# Patient Record
Sex: MALE | Race: WHITE | NOT HISPANIC OR LATINO | Employment: UNEMPLOYED | URBAN - METROPOLITAN AREA
[De-identification: names, ages, dates, MRNs, and addresses within clinical notes are randomized per-mention and may not be internally consistent; named-entity substitution may affect disease eponyms.]

---

## 2022-07-18 ENCOUNTER — LAB VISIT (OUTPATIENT)
Dept: LAB | Facility: HOSPITAL | Age: 52
End: 2022-07-18
Attending: FAMILY MEDICINE
Payer: COMMERCIAL

## 2022-07-18 ENCOUNTER — OFFICE VISIT (OUTPATIENT)
Dept: FAMILY MEDICINE | Facility: CLINIC | Age: 52
End: 2022-07-18
Payer: COMMERCIAL

## 2022-07-18 ENCOUNTER — PATIENT MESSAGE (OUTPATIENT)
Dept: FAMILY MEDICINE | Facility: CLINIC | Age: 52
End: 2022-07-18

## 2022-07-18 VITALS
HEART RATE: 77 BPM | TEMPERATURE: 98 F | RESPIRATION RATE: 14 BRPM | BODY MASS INDEX: 30.44 KG/M2 | DIASTOLIC BLOOD PRESSURE: 80 MMHG | HEIGHT: 76 IN | WEIGHT: 250 LBS | OXYGEN SATURATION: 98 % | SYSTOLIC BLOOD PRESSURE: 130 MMHG

## 2022-07-18 DIAGNOSIS — T50.905A WEIGHT GAIN DUE TO MEDICATION: ICD-10-CM

## 2022-07-18 DIAGNOSIS — Z76.89 ENCOUNTER TO ESTABLISH CARE: Primary | ICD-10-CM

## 2022-07-18 DIAGNOSIS — G47.01 INSOMNIA DUE TO MEDICAL CONDITION: ICD-10-CM

## 2022-07-18 DIAGNOSIS — Z11.59 ENCOUNTER FOR HEPATITIS C SCREENING TEST FOR LOW RISK PATIENT: ICD-10-CM

## 2022-07-18 DIAGNOSIS — R74.01 ELEVATED TRANSAMINASE LEVEL: Primary | ICD-10-CM

## 2022-07-18 DIAGNOSIS — Z00.00 ANNUAL PHYSICAL EXAM: ICD-10-CM

## 2022-07-18 DIAGNOSIS — G47.33 OSA ON CPAP: ICD-10-CM

## 2022-07-18 DIAGNOSIS — F43.21 SITUATIONAL DEPRESSION: ICD-10-CM

## 2022-07-18 DIAGNOSIS — R60.0 LOCALIZED EDEMA: ICD-10-CM

## 2022-07-18 DIAGNOSIS — F31.70 BIPOLAR AFFECTIVE DISORDER IN REMISSION: ICD-10-CM

## 2022-07-18 DIAGNOSIS — I10 ESSENTIAL HYPERTENSION: ICD-10-CM

## 2022-07-18 DIAGNOSIS — R63.5 WEIGHT GAIN DUE TO MEDICATION: ICD-10-CM

## 2022-07-18 DIAGNOSIS — N52.2 DRUG-INDUCED ERECTILE DYSFUNCTION: ICD-10-CM

## 2022-07-18 DIAGNOSIS — Z11.4 SCREENING FOR HIV (HUMAN IMMUNODEFICIENCY VIRUS): ICD-10-CM

## 2022-07-18 LAB
ALBUMIN SERPL BCP-MCNC: 4 G/DL (ref 3.5–5.2)
ALP SERPL-CCNC: 70 U/L (ref 55–135)
ALT SERPL W/O P-5'-P-CCNC: 126 U/L (ref 10–44)
ANION GAP SERPL CALC-SCNC: 23 MMOL/L (ref 8–16)
AST SERPL-CCNC: 107 U/L (ref 10–40)
BASOPHILS # BLD AUTO: 0.02 K/UL (ref 0–0.2)
BASOPHILS NFR BLD: 0.3 % (ref 0–1.9)
BILIRUB SERPL-MCNC: 0.7 MG/DL (ref 0.1–1)
BUN SERPL-MCNC: 7 MG/DL (ref 6–20)
CALCIUM SERPL-MCNC: 9.3 MG/DL (ref 8.7–10.5)
CHLORIDE SERPL-SCNC: 98 MMOL/L (ref 95–110)
CHOLEST SERPL-MCNC: 211 MG/DL (ref 120–199)
CHOLEST/HDLC SERPL: 2.8 {RATIO} (ref 2–5)
CO2 SERPL-SCNC: 21 MMOL/L (ref 23–29)
CREAT SERPL-MCNC: 1.1 MG/DL (ref 0.5–1.4)
DIFFERENTIAL METHOD: ABNORMAL
EOSINOPHIL # BLD AUTO: 0.1 K/UL (ref 0–0.5)
EOSINOPHIL NFR BLD: 0.9 % (ref 0–8)
ERYTHROCYTE [DISTWIDTH] IN BLOOD BY AUTOMATED COUNT: 12.5 % (ref 11.5–14.5)
EST. GFR  (AFRICAN AMERICAN): >60 ML/MIN/1.73 M^2
EST. GFR  (NON AFRICAN AMERICAN): >60 ML/MIN/1.73 M^2
ESTIMATED AVG GLUCOSE: 85 MG/DL (ref 68–131)
GLUCOSE SERPL-MCNC: 71 MG/DL (ref 70–110)
HBA1C MFR BLD: 4.6 % (ref 4–5.6)
HCT VFR BLD AUTO: 38.9 % (ref 40–54)
HDLC SERPL-MCNC: 76 MG/DL (ref 40–75)
HDLC SERPL: 36 % (ref 20–50)
HGB BLD-MCNC: 13.7 G/DL (ref 14–18)
IMM GRANULOCYTES # BLD AUTO: 0.04 K/UL (ref 0–0.04)
IMM GRANULOCYTES NFR BLD AUTO: 0.7 % (ref 0–0.5)
LDLC SERPL CALC-MCNC: 118.8 MG/DL (ref 63–159)
LYMPHOCYTES # BLD AUTO: 1.6 K/UL (ref 1–4.8)
LYMPHOCYTES NFR BLD: 28.5 % (ref 18–48)
MCH RBC QN AUTO: 33 PG (ref 27–31)
MCHC RBC AUTO-ENTMCNC: 35.2 G/DL (ref 32–36)
MCV RBC AUTO: 94 FL (ref 82–98)
MONOCYTES # BLD AUTO: 0.5 K/UL (ref 0.3–1)
MONOCYTES NFR BLD: 8.7 % (ref 4–15)
NEUTROPHILS # BLD AUTO: 3.5 K/UL (ref 1.8–7.7)
NEUTROPHILS NFR BLD: 60.9 % (ref 38–73)
NONHDLC SERPL-MCNC: 135 MG/DL
NRBC BLD-RTO: 0 /100 WBC
PLATELET # BLD AUTO: 146 K/UL (ref 150–450)
PMV BLD AUTO: 9.8 FL (ref 9.2–12.9)
POTASSIUM SERPL-SCNC: 3.3 MMOL/L (ref 3.5–5.1)
PROT SERPL-MCNC: 6.4 G/DL (ref 6–8.4)
RBC # BLD AUTO: 4.15 M/UL (ref 4.6–6.2)
SODIUM SERPL-SCNC: 142 MMOL/L (ref 136–145)
TRIGL SERPL-MCNC: 81 MG/DL (ref 30–150)
TSH SERPL DL<=0.005 MIU/L-ACNC: 0.9 UIU/ML (ref 0.4–4)
WBC # BLD AUTO: 5.75 K/UL (ref 3.9–12.7)

## 2022-07-18 PROCEDURE — 3079F DIAST BP 80-89 MM HG: CPT | Mod: S$GLB,,, | Performed by: FAMILY MEDICINE

## 2022-07-18 PROCEDURE — 86803 HEPATITIS C AB TEST: CPT | Performed by: FAMILY MEDICINE

## 2022-07-18 PROCEDURE — 1159F MED LIST DOCD IN RCRD: CPT | Mod: S$GLB,,, | Performed by: FAMILY MEDICINE

## 2022-07-18 PROCEDURE — 99386 PREV VISIT NEW AGE 40-64: CPT | Mod: S$GLB,,, | Performed by: FAMILY MEDICINE

## 2022-07-18 PROCEDURE — 99999 PR PBB SHADOW E&M-NEW PATIENT-LVL IV: ICD-10-PCS | Mod: PBBFAC,,, | Performed by: FAMILY MEDICINE

## 2022-07-18 PROCEDURE — 3008F BODY MASS INDEX DOCD: CPT | Mod: S$GLB,,, | Performed by: FAMILY MEDICINE

## 2022-07-18 PROCEDURE — 85025 COMPLETE CBC W/AUTO DIFF WBC: CPT | Performed by: FAMILY MEDICINE

## 2022-07-18 PROCEDURE — 3079F PR MOST RECENT DIASTOLIC BLOOD PRESSURE 80-89 MM HG: ICD-10-PCS | Mod: S$GLB,,, | Performed by: FAMILY MEDICINE

## 2022-07-18 PROCEDURE — 3075F SYST BP GE 130 - 139MM HG: CPT | Mod: S$GLB,,, | Performed by: FAMILY MEDICINE

## 2022-07-18 PROCEDURE — 3008F PR BODY MASS INDEX (BMI) DOCUMENTED: ICD-10-PCS | Mod: S$GLB,,, | Performed by: FAMILY MEDICINE

## 2022-07-18 PROCEDURE — 1159F PR MEDICATION LIST DOCUMENTED IN MEDICAL RECORD: ICD-10-PCS | Mod: S$GLB,,, | Performed by: FAMILY MEDICINE

## 2022-07-18 PROCEDURE — 99999 PR PBB SHADOW E&M-NEW PATIENT-LVL IV: CPT | Mod: PBBFAC,,, | Performed by: FAMILY MEDICINE

## 2022-07-18 PROCEDURE — 36415 COLL VENOUS BLD VENIPUNCTURE: CPT | Performed by: FAMILY MEDICINE

## 2022-07-18 PROCEDURE — 87389 HIV-1 AG W/HIV-1&-2 AB AG IA: CPT | Performed by: FAMILY MEDICINE

## 2022-07-18 PROCEDURE — 3075F PR MOST RECENT SYSTOLIC BLOOD PRESS GE 130-139MM HG: ICD-10-PCS | Mod: S$GLB,,, | Performed by: FAMILY MEDICINE

## 2022-07-18 PROCEDURE — 1160F PR REVIEW ALL MEDS BY PRESCRIBER/CLIN PHARMACIST DOCUMENTED: ICD-10-PCS | Mod: S$GLB,,, | Performed by: FAMILY MEDICINE

## 2022-07-18 PROCEDURE — 4010F PR ACE/ARB THEARPY RXD/TAKEN: ICD-10-PCS | Mod: S$GLB,,, | Performed by: FAMILY MEDICINE

## 2022-07-18 PROCEDURE — 99386 PR PREVENTIVE VISIT,NEW,40-64: ICD-10-PCS | Mod: S$GLB,,, | Performed by: FAMILY MEDICINE

## 2022-07-18 PROCEDURE — 4010F ACE/ARB THERAPY RXD/TAKEN: CPT | Mod: S$GLB,,, | Performed by: FAMILY MEDICINE

## 2022-07-18 PROCEDURE — 84443 ASSAY THYROID STIM HORMONE: CPT | Performed by: FAMILY MEDICINE

## 2022-07-18 PROCEDURE — 1160F RVW MEDS BY RX/DR IN RCRD: CPT | Mod: S$GLB,,, | Performed by: FAMILY MEDICINE

## 2022-07-18 PROCEDURE — 83036 HEMOGLOBIN GLYCOSYLATED A1C: CPT | Performed by: FAMILY MEDICINE

## 2022-07-18 PROCEDURE — 80061 LIPID PANEL: CPT | Performed by: FAMILY MEDICINE

## 2022-07-18 PROCEDURE — 80053 COMPREHEN METABOLIC PANEL: CPT | Performed by: FAMILY MEDICINE

## 2022-07-18 RX ORDER — SILDENAFIL CITRATE 20 MG/1
20-60 TABLET ORAL DAILY PRN
Qty: 60 TABLET | Refills: 2 | Status: SHIPPED | OUTPATIENT
Start: 2022-07-18

## 2022-07-18 RX ORDER — LURASIDONE HYDROCHLORIDE 40 MG/1
TABLET, FILM COATED ORAL
COMMUNITY
Start: 2021-07-18 | End: 2022-07-18 | Stop reason: SDUPTHER

## 2022-07-18 RX ORDER — MELATONIN 1 MG
TABLET,CHEWABLE ORAL
COMMUNITY
Start: 2014-07-18 | End: 2022-12-28 | Stop reason: ALTCHOICE

## 2022-07-18 RX ORDER — TRAZODONE HYDROCHLORIDE 150 MG/1
TABLET ORAL
COMMUNITY
Start: 2019-07-18 | End: 2022-07-18

## 2022-07-18 RX ORDER — HYDROCHLOROTHIAZIDE 25 MG/1
25 TABLET ORAL DAILY
Qty: 90 TABLET | Refills: 3 | Status: ON HOLD | OUTPATIENT
Start: 2022-07-18 | End: 2023-05-28 | Stop reason: HOSPADM

## 2022-07-18 RX ORDER — HYDROCHLOROTHIAZIDE 25 MG/1
TABLET ORAL
COMMUNITY
End: 2022-07-18 | Stop reason: SDUPTHER

## 2022-07-18 RX ORDER — LAMOTRIGINE 100 MG/1
TABLET ORAL
COMMUNITY
Start: 2020-07-18 | End: 2022-07-18 | Stop reason: SDUPTHER

## 2022-07-18 RX ORDER — LURASIDONE HYDROCHLORIDE 40 MG/1
TABLET, FILM COATED ORAL
Qty: 90 TABLET | Refills: 3 | Status: SHIPPED | OUTPATIENT
Start: 2022-07-18 | End: 2022-09-21

## 2022-07-18 RX ORDER — TEMAZEPAM 15 MG/1
15 CAPSULE ORAL NIGHTLY PRN
Qty: 30 CAPSULE | Refills: 0 | Status: SHIPPED | OUTPATIENT
Start: 2022-07-18 | End: 2022-08-17

## 2022-07-18 RX ORDER — SILDENAFIL CITRATE 20 MG/1
TABLET ORAL
COMMUNITY
End: 2022-07-18 | Stop reason: SDUPTHER

## 2022-07-18 RX ORDER — NEBIVOLOL 20 MG/1
20 TABLET ORAL DAILY
Qty: 90 TABLET | Refills: 3 | Status: SHIPPED | OUTPATIENT
Start: 2022-07-18 | End: 2023-05-29 | Stop reason: SDUPTHER

## 2022-07-18 RX ORDER — LOSARTAN POTASSIUM 25 MG/1
25 TABLET ORAL DAILY
Qty: 90 TABLET | Refills: 3 | Status: SHIPPED | OUTPATIENT
Start: 2022-07-18 | End: 2023-05-29 | Stop reason: SDUPTHER

## 2022-07-18 RX ORDER — NEBIVOLOL 20 MG/1
TABLET ORAL
COMMUNITY
Start: 2015-07-18 | End: 2022-07-18 | Stop reason: SDUPTHER

## 2022-07-18 RX ORDER — PHENTERMINE HYDROCHLORIDE 37.5 MG/1
37.5 TABLET ORAL EVERY MORNING
COMMUNITY
Start: 2022-07-05 | End: 2022-08-25

## 2022-07-18 RX ORDER — LOSARTAN POTASSIUM 25 MG/1
TABLET ORAL
COMMUNITY
Start: 2015-07-18 | End: 2022-07-18 | Stop reason: SDUPTHER

## 2022-07-18 RX ORDER — LAMOTRIGINE 100 MG/1
TABLET ORAL
Qty: 180 TABLET | Refills: 3 | Status: SHIPPED | OUTPATIENT
Start: 2022-07-18 | End: 2022-12-28

## 2022-07-18 NOTE — PATIENT INSTRUCTIONS
Mental Health Recommendations:     Francisca Landry - [phone] *therapist* ([place] [facility] ONLY)  Alexia Bee - [phone] *psychiatrist*  Rosa Ledezma - [phone] *therapist*  [facility]) - [phone]  [facility]  - [phone]  [facility]) - [phone]  [facility] ([facility] - [phone]  Eli Reyes ([place]) - [phone] *therapist*  Boris Purcell - [phone]   [facility]      Please ALWAYS ask for their recommendations, if the providers office, or facility is not accepting new patients.

## 2022-07-18 NOTE — PROGRESS NOTES
[facility] - Clinic Note    Subjective      Mr. Reyes is a 51 y.o. male who presents to clinic for Establish Care    Patient has a history hypertension, bipolar disorder, situational depression, insomnia, erectile dysfunction.  Blood pressure has been controlled with losartan and Bystolic.  He has not been taking the hydrochlorothiazide.  He has notice some swelling in his ankles.  He has just started taking the hydrochlorothiazide yesterday.  He has a history of bipolar disorder and situational depression that are controlled with Latuda and Lamictal but this seems to cause weight gain.  He started taking Adipex about 2 months ago to help with the waking.  This is help with concentration.  He has not been sleeping well.  He has a history of insomnia.  He is currently taking trazodone 150 mg daily which is not helping.  He has tried Elavil in the past without improvement.  He also has a history of obstructive sleep apnea on CPAP machine which seems to be working well.    [facility] Georgette has a past medical history of Bipolar disorder, History of alcohol abuse, and Hypertension.   PSXH Georgette has a past surgical history that includes Cholecystectomy; Back surgery; Anterior cruciate ligament repair; and gastroplasty, sleeve, endoscopic.    Georgette's family history includes Hypertension in his father and mother.    Georgette reports that he has never smoked. He has never used smokeless tobacco. He reports current alcohol use. No history on file for drug use.   JOSE Palomino has No Known Allergies.   MED Georgette has a current medication list which includes the following prescription(s): melatonin, phentermine, hydrochlorothiazide, lamotrigine, losartan, lurasidone, nebivolol, sildenafil, and temazepam.     Review of Systems   Constitutional: Negative for chills and fever.   HENT: Negative for congestion and rhinorrhea.    Eyes: Negative for visual disturbance.   Respiratory: Negative for cough and shortness of breath.   "  Cardiovascular: Positive for leg swelling. Negative for chest pain.   Gastrointestinal: Negative for abdominal pain, constipation, diarrhea, nausea and vomiting.   Genitourinary: Negative for dysuria.   Musculoskeletal: Negative for myalgias.   Skin: Negative for rash.   Neurological: Negative for weakness and headaches.   Psychiatric/Behavioral: Positive for sleep disturbance.     Objective     /80 (BP Location: Left arm, Patient Position: Sitting, BP Method: Large (Manual))   Pulse 77   Temp 98.1 °F (36.7 °C) (Temporal)   Resp 14   Ht 6' 4" (1.93 m)   Wt 113.4 kg (250 lb)   SpO2 98%   BMI 30.43 kg/m²     Physical Exam  Vitals and nursing note reviewed.   Constitutional:       General: He is not in acute distress.     Appearance: Normal appearance. He is well-developed. He is not diaphoretic.   HENT:      Head: Normocephalic and atraumatic.      Right Ear: External ear normal.      Left Ear: External ear normal.   Eyes:      General:         Right eye: No discharge.         Left eye: No discharge.   Cardiovascular:      Rate and Rhythm: Normal rate and regular rhythm.      Heart sounds: Normal heart sounds.   Pulmonary:      Effort: Pulmonary effort is normal.      Breath sounds: Normal breath sounds. No wheezing or rales.   Skin:     General: Skin is warm and dry.   Neurological:      Mental Status: He is alert and oriented to person, place, and time. Mental status is at baseline.   Psychiatric:         Mood and Affect: Mood normal.         Behavior: Behavior normal.         Thought Content: Thought content normal.         Judgment: Judgment normal.        Assessment/Plan     1. Encounter to establish care     2. Annual physical exam  Lipid Panel    Comprehensive Metabolic Panel    CBC Auto Differential    Hemoglobin A1C    TSH   3. Bipolar affective disorder in remission  lurasidone (LATUDA) 40 mg Tab tablet    lamoTRIgine (LAMICTAL) 100 MG tablet   4. Insomnia due to medical condition  temazepam " (RESTORIL) 15 mg Cap   5. Situational depression  Comprehensive Metabolic Panel    CBC Auto Differential    lurasidone (LATUDA) 40 mg Tab tablet    lamoTRIgine (LAMICTAL) 100 MG tablet   6. Localized edema  hydroCHLOROthiazide (HYDRODIURIL) 25 MG tablet   7. Weight gain due to medication     8. Essential hypertension  Lipid Panel    Comprehensive Metabolic Panel    losartan (COZAAR) 25 MG tablet    nebivoloL (BYSTOLIC) 20 mg Tab    hydroCHLOROthiazide (HYDRODIURIL) 25 MG tablet   9. Drug-induced erectile dysfunction  sildenafil (REVATIO) 20 mg Tab   10. BYRON on CPAP     11. Encounter for hepatitis C screening test for low risk patient  HIV 1/2 Ag/Ab (4th Gen)   12. Screening for HIV (human immunodeficiency virus)  Hepatitis C Antibody     Will obtain lab work as above for evaluation.  Swelling may improve with the starting of hydrochlorothiazide.  Will taper off of trazodone as this can contribute to weight gain as well.  Continue Latuda and Lamictal.  Will trial temazepam for insomnia.  Improved sleep may help with daytime functioning.  Refilled other medications.   reviewed.  No red flags.  Follow-up in 1 month for re-evaluation.    Future Appointments   Date Time Provider Department Center   8/25/2022  3:40 PM Raymundo Stubbs MD [facility]         Raymundo Stubbs MD  Family Medicine  [facility]

## 2022-07-19 LAB
HCV AB SERPL QL IA: NEGATIVE
HIV 1+2 AB+HIV1 P24 AG SERPL QL IA: NEGATIVE

## 2022-07-22 ENCOUNTER — PATIENT MESSAGE (OUTPATIENT)
Dept: ADMINISTRATIVE | Facility: HOSPITAL | Age: 52
End: 2022-07-22
Payer: COMMERCIAL

## 2022-07-27 DIAGNOSIS — Z12.11 COLON CANCER SCREENING: ICD-10-CM

## 2022-08-01 ENCOUNTER — PATIENT MESSAGE (OUTPATIENT)
Dept: ADMINISTRATIVE | Facility: HOSPITAL | Age: 52
End: 2022-08-01
Payer: COMMERCIAL

## 2022-08-16 ENCOUNTER — HOSPITAL ENCOUNTER (OUTPATIENT)
Dept: RADIOLOGY | Facility: HOSPITAL | Age: 52
Discharge: HOME | End: 2022-08-16
Attending: FAMILY MEDICINE
Payer: COMMERCIAL

## 2022-08-16 DIAGNOSIS — R74.01 ELEVATED TRANSAMINASE LEVEL: ICD-10-CM

## 2022-08-16 PROCEDURE — 76705 ECHO EXAM OF ABDOMEN: CPT | Mod: TC

## 2022-08-16 PROCEDURE — 76705 US ABDOMEN LIMITED: ICD-10-PCS | Mod: 26,,, | Performed by: RADIOLOGY

## 2022-08-16 PROCEDURE — 76705 ECHO EXAM OF ABDOMEN: CPT | Mod: 26,,, | Performed by: RADIOLOGY

## 2022-08-25 ENCOUNTER — OFFICE VISIT (OUTPATIENT)
Dept: FAMILY MEDICINE | Facility: CLINIC | Age: 52
End: 2022-08-25
Payer: COMMERCIAL

## 2022-08-25 VITALS
WEIGHT: 241.19 LBS | HEIGHT: 76 IN | DIASTOLIC BLOOD PRESSURE: 86 MMHG | RESPIRATION RATE: 12 BRPM | TEMPERATURE: 98 F | SYSTOLIC BLOOD PRESSURE: 132 MMHG | HEART RATE: 79 BPM | BODY MASS INDEX: 29.37 KG/M2 | OXYGEN SATURATION: 99 %

## 2022-08-25 DIAGNOSIS — G47.01 INSOMNIA DUE TO MEDICAL CONDITION: Primary | ICD-10-CM

## 2022-08-25 PROCEDURE — 3044F PR MOST RECENT HEMOGLOBIN A1C LEVEL <7.0%: ICD-10-PCS | Mod: S$GLB,,, | Performed by: FAMILY MEDICINE

## 2022-08-25 PROCEDURE — 3079F PR MOST RECENT DIASTOLIC BLOOD PRESSURE 80-89 MM HG: ICD-10-PCS | Mod: S$GLB,,, | Performed by: FAMILY MEDICINE

## 2022-08-25 PROCEDURE — 4010F ACE/ARB THERAPY RXD/TAKEN: CPT | Mod: S$GLB,,, | Performed by: FAMILY MEDICINE

## 2022-08-25 PROCEDURE — 99999 PR PBB SHADOW E&M-EST. PATIENT-LVL IV: CPT | Mod: PBBFAC,,, | Performed by: FAMILY MEDICINE

## 2022-08-25 PROCEDURE — 3075F PR MOST RECENT SYSTOLIC BLOOD PRESS GE 130-139MM HG: ICD-10-PCS | Mod: S$GLB,,, | Performed by: FAMILY MEDICINE

## 2022-08-25 PROCEDURE — 3008F PR BODY MASS INDEX (BMI) DOCUMENTED: ICD-10-PCS | Mod: S$GLB,,, | Performed by: FAMILY MEDICINE

## 2022-08-25 PROCEDURE — 3044F HG A1C LEVEL LT 7.0%: CPT | Mod: S$GLB,,, | Performed by: FAMILY MEDICINE

## 2022-08-25 PROCEDURE — 99214 PR OFFICE/OUTPT VISIT, EST, LEVL IV, 30-39 MIN: ICD-10-PCS | Mod: S$GLB,,, | Performed by: FAMILY MEDICINE

## 2022-08-25 PROCEDURE — 1160F RVW MEDS BY RX/DR IN RCRD: CPT | Mod: S$GLB,,, | Performed by: FAMILY MEDICINE

## 2022-08-25 PROCEDURE — 1159F PR MEDICATION LIST DOCUMENTED IN MEDICAL RECORD: ICD-10-PCS | Mod: S$GLB,,, | Performed by: FAMILY MEDICINE

## 2022-08-25 PROCEDURE — 99214 OFFICE O/P EST MOD 30 MIN: CPT | Mod: S$GLB,,, | Performed by: FAMILY MEDICINE

## 2022-08-25 PROCEDURE — 3008F BODY MASS INDEX DOCD: CPT | Mod: S$GLB,,, | Performed by: FAMILY MEDICINE

## 2022-08-25 PROCEDURE — 99999 PR PBB SHADOW E&M-EST. PATIENT-LVL IV: ICD-10-PCS | Mod: PBBFAC,,, | Performed by: FAMILY MEDICINE

## 2022-08-25 PROCEDURE — 4010F PR ACE/ARB THEARPY RXD/TAKEN: ICD-10-PCS | Mod: S$GLB,,, | Performed by: FAMILY MEDICINE

## 2022-08-25 PROCEDURE — 1159F MED LIST DOCD IN RCRD: CPT | Mod: S$GLB,,, | Performed by: FAMILY MEDICINE

## 2022-08-25 PROCEDURE — 3075F SYST BP GE 130 - 139MM HG: CPT | Mod: S$GLB,,, | Performed by: FAMILY MEDICINE

## 2022-08-25 PROCEDURE — 1160F PR REVIEW ALL MEDS BY PRESCRIBER/CLIN PHARMACIST DOCUMENTED: ICD-10-PCS | Mod: S$GLB,,, | Performed by: FAMILY MEDICINE

## 2022-08-25 PROCEDURE — 3079F DIAST BP 80-89 MM HG: CPT | Mod: S$GLB,,, | Performed by: FAMILY MEDICINE

## 2022-08-25 RX ORDER — TEMAZEPAM 30 MG/1
30 CAPSULE ORAL NIGHTLY PRN
Qty: 30 CAPSULE | Refills: 0 | Status: SHIPPED | OUTPATIENT
Start: 2022-08-25 | End: 2022-09-21 | Stop reason: SDUPTHER

## 2022-08-25 NOTE — PROGRESS NOTES
"[facility] - Clinic Note    Subjective      Mr. Reyes is a 51 y.o. male who presents to clinic for Follow-up (1mth follow up)    The temazepam was not helpful.  Still having a little bit of swelling in his lower extremities.    PMH Georgette has a past medical history of Bipolar disorder, History of alcohol abuse, and Hypertension.   PSXH Georgette has a past surgical history that includes Cholecystectomy; Back surgery; Anterior cruciate ligament repair; and gastroplasty, sleeve, endoscopic.   FH Georgette's family history includes Hypertension in his father and mother.   SH Georgette reports that he has never smoked. He has never used smokeless tobacco. He reports current alcohol use. No history on file for drug use.   ALG Georgette has No Known Allergies.   MED Georgette has a current medication list which includes the following prescription(s): hydrochlorothiazide, lamotrigine, losartan, lurasidone, melatonin, nebivolol, sildenafil, and temazepam.     Review of Systems   Constitutional: Negative for chills and fever.   Respiratory: Negative for shortness of breath.    Cardiovascular: Negative for chest pain.     Objective     /86 (BP Location: Left arm, Patient Position: Sitting, BP Method: Large (Manual))   Pulse 79   Temp 98.1 °F (36.7 °C) (Temporal)   Resp 12   Ht 6' 4" (1.93 m)   Wt 109.4 kg (241 lb 3.2 oz)   SpO2 99%   BMI 29.36 kg/m²     Physical Exam  Vitals and nursing note reviewed.   Constitutional:       General: He is not in acute distress.     Appearance: Normal appearance. He is well-developed. He is not diaphoretic.   HENT:      Head: Normocephalic and atraumatic.      Right Ear: External ear normal.      Left Ear: External ear normal.   Eyes:      General:         Right eye: No discharge.         Left eye: No discharge.   Cardiovascular:      Rate and Rhythm: Normal rate and regular rhythm.      Heart sounds: Normal heart sounds.   Pulmonary:      Effort: Pulmonary effort is normal.      Breath sounds: " Normal breath sounds. No wheezing or rales.   Skin:     General: Skin is warm and dry.   Neurological:      Mental Status: He is alert and oriented to person, place, and time. Mental status is at baseline.   Psychiatric:         Mood and Affect: Mood normal.         Behavior: Behavior normal.         Thought Content: Thought content normal.         Judgment: Judgment normal.        Assessment/Plan     1. Insomnia due to medical condition  temazepam (RESTORIL) 30 mg capsule     Increased temazepam to 30 mg q.h.s..  reviewed. No red flags. Continue working on dietary change and exercise.  Will follow-up in 3 months and re-evaluate.    No future appointments.      Raymundo Stubbs MD  Family Medicine  [facility]

## 2022-09-14 DIAGNOSIS — F31.70 BIPOLAR AFFECTIVE DISORDER IN REMISSION: Primary | ICD-10-CM

## 2022-09-14 DIAGNOSIS — F43.21 SITUATIONAL DEPRESSION: ICD-10-CM

## 2022-09-21 ENCOUNTER — OFFICE VISIT (OUTPATIENT)
Dept: FAMILY MEDICINE | Facility: CLINIC | Age: 52
End: 2022-09-21
Payer: COMMERCIAL

## 2022-09-21 VITALS
WEIGHT: 244.63 LBS | DIASTOLIC BLOOD PRESSURE: 80 MMHG | BODY MASS INDEX: 29.79 KG/M2 | OXYGEN SATURATION: 99 % | HEART RATE: 76 BPM | SYSTOLIC BLOOD PRESSURE: 130 MMHG | TEMPERATURE: 98 F | RESPIRATION RATE: 12 BRPM | HEIGHT: 76 IN

## 2022-09-21 DIAGNOSIS — R20.2 NUMBNESS AND TINGLING: ICD-10-CM

## 2022-09-21 DIAGNOSIS — G47.01 INSOMNIA DUE TO MEDICAL CONDITION: ICD-10-CM

## 2022-09-21 DIAGNOSIS — F31.75 BIPOLAR DISORDER, IN PARTIAL REMISSION, MOST RECENT EPISODE DEPRESSED: Primary | ICD-10-CM

## 2022-09-21 DIAGNOSIS — R20.0 NUMBNESS AND TINGLING: ICD-10-CM

## 2022-09-21 PROCEDURE — 3044F PR MOST RECENT HEMOGLOBIN A1C LEVEL <7.0%: ICD-10-PCS | Mod: S$GLB,,, | Performed by: FAMILY MEDICINE

## 2022-09-21 PROCEDURE — 99999 PR PBB SHADOW E&M-EST. PATIENT-LVL III: CPT | Mod: PBBFAC,,, | Performed by: FAMILY MEDICINE

## 2022-09-21 PROCEDURE — 4010F PR ACE/ARB THEARPY RXD/TAKEN: ICD-10-PCS | Mod: S$GLB,,, | Performed by: FAMILY MEDICINE

## 2022-09-21 PROCEDURE — 3008F BODY MASS INDEX DOCD: CPT | Mod: S$GLB,,, | Performed by: FAMILY MEDICINE

## 2022-09-21 PROCEDURE — 99214 PR OFFICE/OUTPT VISIT, EST, LEVL IV, 30-39 MIN: ICD-10-PCS | Mod: S$GLB,,, | Performed by: FAMILY MEDICINE

## 2022-09-21 PROCEDURE — 3008F PR BODY MASS INDEX (BMI) DOCUMENTED: ICD-10-PCS | Mod: S$GLB,,, | Performed by: FAMILY MEDICINE

## 2022-09-21 PROCEDURE — 3079F PR MOST RECENT DIASTOLIC BLOOD PRESSURE 80-89 MM HG: ICD-10-PCS | Mod: S$GLB,,, | Performed by: FAMILY MEDICINE

## 2022-09-21 PROCEDURE — 3075F SYST BP GE 130 - 139MM HG: CPT | Mod: S$GLB,,, | Performed by: FAMILY MEDICINE

## 2022-09-21 PROCEDURE — 1160F PR REVIEW ALL MEDS BY PRESCRIBER/CLIN PHARMACIST DOCUMENTED: ICD-10-PCS | Mod: S$GLB,,, | Performed by: FAMILY MEDICINE

## 2022-09-21 PROCEDURE — 3079F DIAST BP 80-89 MM HG: CPT | Mod: S$GLB,,, | Performed by: FAMILY MEDICINE

## 2022-09-21 PROCEDURE — 3075F PR MOST RECENT SYSTOLIC BLOOD PRESS GE 130-139MM HG: ICD-10-PCS | Mod: S$GLB,,, | Performed by: FAMILY MEDICINE

## 2022-09-21 PROCEDURE — 4010F ACE/ARB THERAPY RXD/TAKEN: CPT | Mod: S$GLB,,, | Performed by: FAMILY MEDICINE

## 2022-09-21 PROCEDURE — 1159F MED LIST DOCD IN RCRD: CPT | Mod: S$GLB,,, | Performed by: FAMILY MEDICINE

## 2022-09-21 PROCEDURE — 99999 PR PBB SHADOW E&M-EST. PATIENT-LVL III: ICD-10-PCS | Mod: PBBFAC,,, | Performed by: FAMILY MEDICINE

## 2022-09-21 PROCEDURE — 99214 OFFICE O/P EST MOD 30 MIN: CPT | Mod: S$GLB,,, | Performed by: FAMILY MEDICINE

## 2022-09-21 PROCEDURE — 1159F PR MEDICATION LIST DOCUMENTED IN MEDICAL RECORD: ICD-10-PCS | Mod: S$GLB,,, | Performed by: FAMILY MEDICINE

## 2022-09-21 PROCEDURE — 3044F HG A1C LEVEL LT 7.0%: CPT | Mod: S$GLB,,, | Performed by: FAMILY MEDICINE

## 2022-09-21 PROCEDURE — 1160F RVW MEDS BY RX/DR IN RCRD: CPT | Mod: S$GLB,,, | Performed by: FAMILY MEDICINE

## 2022-09-21 RX ORDER — TEMAZEPAM 30 MG/1
30 CAPSULE ORAL NIGHTLY PRN
Qty: 90 CAPSULE | Refills: 0 | Status: SHIPPED | OUTPATIENT
Start: 2022-09-21 | End: 2022-10-27

## 2022-09-21 RX ORDER — METHYLPREDNISOLONE 4 MG/1
TABLET ORAL
Qty: 21 EACH | Refills: 0 | Status: SHIPPED | OUTPATIENT
Start: 2022-09-21 | End: 2022-10-12

## 2022-09-21 RX ORDER — LURASIDONE HYDROCHLORIDE 60 MG/1
60 TABLET, FILM COATED ORAL DAILY
Qty: 90 TABLET | Refills: 3 | Status: SHIPPED | OUTPATIENT
Start: 2022-09-21 | End: 2022-12-28 | Stop reason: DRUGHIGH

## 2022-09-21 NOTE — PROGRESS NOTES
"[facility] - Clinic Note    Subjective      Mr. Reyes is a 52 y.o. male who presents to clinic for Follow-up (Discuss test results)    Temazepam is helpful.  Off trazodone.  Has been having more depression symptoms.  Having some numbness and tingling of the great toes for the last couple of weeks.    PMH Georgette has a past medical history of Bipolar disorder, History of alcohol abuse, and Hypertension.   PSXH Georgette has a past surgical history that includes Cholecystectomy; Back surgery; Anterior cruciate ligament repair; and gastroplasty, sleeve, endoscopic.    Georgette's family history includes Hypertension in his father and mother.   SH Georgette reports that he has never smoked. He has never used smokeless tobacco. He reports current alcohol use. No history on file for drug use.   ALG Georgette has No Known Allergies.   MED Georgette has a current medication list which includes the following prescription(s): hydrochlorothiazide, lamotrigine, losartan, melatonin, nebivolol, sildenafil, lurasidone, methylprednisolone, and temazepam.     Review of Systems   Constitutional:  Negative for chills and fever.   Respiratory:  Negative for shortness of breath.    Cardiovascular:  Negative for chest pain.   Objective     /80 (BP Location: Left arm, Patient Position: Sitting, BP Method: Large (Manual))   Pulse 76   Temp 97.6 °F (36.4 °C) (Temporal)   Resp 12   Ht 6' 4" (1.93 m)   Wt 110.9 kg (244 lb 9.6 oz)   SpO2 99%   BMI 29.77 kg/m²     Physical Exam  Vitals and nursing note reviewed.   Constitutional:       General: He is not in acute distress.     Appearance: Normal appearance. He is well-developed. He is not diaphoretic.   HENT:      Head: Normocephalic and atraumatic.      Right Ear: External ear normal.      Left Ear: External ear normal.   Eyes:      General:         Right eye: No discharge.         Left eye: No discharge.   Cardiovascular:      Rate and Rhythm: Normal rate and regular rhythm.      Heart sounds: " Normal heart sounds.   Pulmonary:      Effort: Pulmonary effort is normal.      Breath sounds: Normal breath sounds. No wheezing or rales.   Skin:     General: Skin is warm and dry.   Neurological:      Mental Status: He is alert and oriented to person, place, and time. Mental status is at baseline.   Psychiatric:         Mood and Affect: Mood normal.         Behavior: Behavior normal.         Thought Content: Thought content normal.         Judgment: Judgment normal.      Assessment/Plan     1. Bipolar disorder, in partial remission, most recent episode depressed  lurasidone (LATUDA) 60 mg Tab tablet      2. Insomnia due to medical condition  temazepam (RESTORIL) 30 mg capsule      3. Numbness and tingling  methylPREDNISolone (MEDROL DOSEPACK) 4 mg tablet        Continue temazepam.   reviewed.  No red flags.  Increase Latuda to 60 mg.  Will have patient follow-up with psychiatry.  Will trial Medrol pack to see if that helps with the numbness and tingling.    No future appointments.      Raymundo Stubbs MD  Family Medicine  [facility]

## 2022-10-27 ENCOUNTER — OFFICE VISIT (OUTPATIENT)
Dept: FAMILY MEDICINE | Facility: CLINIC | Age: 52
End: 2022-10-27
Payer: COMMERCIAL

## 2022-10-27 VITALS
HEART RATE: 80 BPM | OXYGEN SATURATION: 98 % | BODY MASS INDEX: 29.71 KG/M2 | HEIGHT: 76 IN | WEIGHT: 244 LBS | DIASTOLIC BLOOD PRESSURE: 88 MMHG | SYSTOLIC BLOOD PRESSURE: 130 MMHG

## 2022-10-27 DIAGNOSIS — L98.9 SKIN LESION: ICD-10-CM

## 2022-10-27 DIAGNOSIS — F31.75 BIPOLAR DISORDER, IN PARTIAL REMISSION, MOST RECENT EPISODE DEPRESSED: Primary | ICD-10-CM

## 2022-10-27 DIAGNOSIS — G47.01 INSOMNIA DUE TO MEDICAL CONDITION: ICD-10-CM

## 2022-10-27 DIAGNOSIS — F10.930 ALCOHOL WITHDRAWAL SYNDROME WITHOUT COMPLICATION: ICD-10-CM

## 2022-10-27 PROCEDURE — 3075F SYST BP GE 130 - 139MM HG: CPT | Mod: S$GLB,,, | Performed by: FAMILY MEDICINE

## 2022-10-27 PROCEDURE — 1160F RVW MEDS BY RX/DR IN RCRD: CPT | Mod: S$GLB,,, | Performed by: FAMILY MEDICINE

## 2022-10-27 PROCEDURE — 99214 PR OFFICE/OUTPT VISIT, EST, LEVL IV, 30-39 MIN: ICD-10-PCS | Mod: S$GLB,,, | Performed by: FAMILY MEDICINE

## 2022-10-27 PROCEDURE — 3044F PR MOST RECENT HEMOGLOBIN A1C LEVEL <7.0%: ICD-10-PCS | Mod: S$GLB,,, | Performed by: FAMILY MEDICINE

## 2022-10-27 PROCEDURE — 4010F PR ACE/ARB THEARPY RXD/TAKEN: ICD-10-PCS | Mod: S$GLB,,, | Performed by: FAMILY MEDICINE

## 2022-10-27 PROCEDURE — 1160F PR REVIEW ALL MEDS BY PRESCRIBER/CLIN PHARMACIST DOCUMENTED: ICD-10-PCS | Mod: S$GLB,,, | Performed by: FAMILY MEDICINE

## 2022-10-27 PROCEDURE — 1159F PR MEDICATION LIST DOCUMENTED IN MEDICAL RECORD: ICD-10-PCS | Mod: S$GLB,,, | Performed by: FAMILY MEDICINE

## 2022-10-27 PROCEDURE — 99999 PR PBB SHADOW E&M-EST. PATIENT-LVL IV: CPT | Mod: PBBFAC,,, | Performed by: FAMILY MEDICINE

## 2022-10-27 PROCEDURE — 3079F DIAST BP 80-89 MM HG: CPT | Mod: S$GLB,,, | Performed by: FAMILY MEDICINE

## 2022-10-27 PROCEDURE — 99214 OFFICE O/P EST MOD 30 MIN: CPT | Mod: S$GLB,,, | Performed by: FAMILY MEDICINE

## 2022-10-27 PROCEDURE — 4010F ACE/ARB THERAPY RXD/TAKEN: CPT | Mod: S$GLB,,, | Performed by: FAMILY MEDICINE

## 2022-10-27 PROCEDURE — 3075F PR MOST RECENT SYSTOLIC BLOOD PRESS GE 130-139MM HG: ICD-10-PCS | Mod: S$GLB,,, | Performed by: FAMILY MEDICINE

## 2022-10-27 PROCEDURE — 3079F PR MOST RECENT DIASTOLIC BLOOD PRESSURE 80-89 MM HG: ICD-10-PCS | Mod: S$GLB,,, | Performed by: FAMILY MEDICINE

## 2022-10-27 PROCEDURE — 99999 PR PBB SHADOW E&M-EST. PATIENT-LVL IV: ICD-10-PCS | Mod: PBBFAC,,, | Performed by: FAMILY MEDICINE

## 2022-10-27 PROCEDURE — 1159F MED LIST DOCD IN RCRD: CPT | Mod: S$GLB,,, | Performed by: FAMILY MEDICINE

## 2022-10-27 PROCEDURE — 3044F HG A1C LEVEL LT 7.0%: CPT | Mod: S$GLB,,, | Performed by: FAMILY MEDICINE

## 2022-10-27 RX ORDER — DOXEPIN HYDROCHLORIDE 10 MG/1
10 CAPSULE ORAL NIGHTLY
Qty: 30 CAPSULE | Refills: 11 | Status: SHIPPED | OUTPATIENT
Start: 2022-10-27 | End: 2022-12-28 | Stop reason: ALTCHOICE

## 2022-10-29 RX ORDER — CHLORDIAZEPOXIDE HYDROCHLORIDE 25 MG/1
CAPSULE, GELATIN COATED ORAL
Qty: 20 CAPSULE | Refills: 0 | Status: SHIPPED | OUTPATIENT
Start: 2022-10-29 | End: 2022-12-28 | Stop reason: ALTCHOICE

## 2022-10-29 NOTE — PROGRESS NOTES
"[facility] - Clinic Note    Subjective      Mr. Reyes is a 52 y.o. male who presents to clinic for Eczema    having trouble with eczema.  Also wants a tap establish with psychiatry.  Going stop drinking alcohol this weekend.  Temazepam has not been helpful.  Also has been having some numbness in both his big toes.  Constant.  No back pain.    PMH Georgette has a past medical history of Bipolar disorder, History of alcohol abuse, and Hypertension.   PSXH Georgette has a past surgical history that includes Cholecystectomy; Back surgery; Anterior cruciate ligament repair; and gastroplasty, sleeve, endoscopic.    Georgette's family history includes Hypertension in his father and mother.   SH Georgette reports that he has never smoked. He has never used smokeless tobacco. He reports current alcohol use. No history on file for drug use.   ALG Georgette has No Known Allergies.   MED Georgette has a current medication list which includes the following prescription(s): hydrochlorothiazide, lamotrigine, losartan, lurasidone, melatonin, nebivolol, sildenafil, chlordiazepoxide, and doxepin.     Review of Systems   Constitutional:  Negative for chills and fever.   Respiratory:  Negative for shortness of breath.    Cardiovascular:  Negative for chest pain.   Objective     /88 (BP Location: Left arm, Patient Position: Sitting, BP Method: Medium (Manual))   Pulse 80   Ht 6' 4" (1.93 m)   Wt 110.7 kg (244 lb)   SpO2 98%   BMI 29.70 kg/m²     Physical Exam  Vitals and nursing note reviewed.   Constitutional:       General: He is not in acute distress.     Appearance: Normal appearance. He is well-developed. He is not diaphoretic.   HENT:      Head: Normocephalic and atraumatic.      Right Ear: External ear normal.      Left Ear: External ear normal.   Eyes:      General:         Right eye: No discharge.         Left eye: No discharge.   Cardiovascular:      Rate and Rhythm: Normal rate and regular rhythm.      Heart sounds: Normal heart " sounds.   Pulmonary:      Effort: Pulmonary effort is normal.      Breath sounds: Normal breath sounds. No wheezing or rales.   Skin:     General: Skin is warm and dry.   Neurological:      Mental Status: He is alert and oriented to person, place, and time. Mental status is at baseline.   Psychiatric:         Mood and Affect: Mood normal.         Behavior: Behavior normal.         Thought Content: Thought content normal.         Judgment: Judgment normal.      Assessment/Plan     1. Bipolar disorder, in partial remission, most recent episode depressed  Ambulatory referral/consult to Psychiatry      2. Skin lesion  Ambulatory referral/consult to Dermatology      3. Insomnia due to medical condition  doxepin (SINEQUAN) 10 MG capsule      4. Alcohol withdrawal syndrome without complication  chlordiazepoxide (LIBRIUM) 25 MG Cap        Librium taper given to patient with instructions given cessation of alcohol.  Will trial doxepin for insomnia.  Stop temazepam.  Referral to Dermatology as patient may have psoriasis?  Versus eczema.  Steroids have not been helpful.  Referral to psychiatry has been placed.  reviewed. No red flags.    No future appointments.      Raymundo Stubbs MD  Family Medicine  [facility]

## 2022-11-01 ENCOUNTER — PATIENT MESSAGE (OUTPATIENT)
Dept: PSYCHIATRY | Facility: CLINIC | Age: 52
End: 2022-11-01
Payer: COMMERCIAL

## 2022-12-27 RX ORDER — LURASIDONE HYDROCHLORIDE 40 MG/1
40 TABLET, FILM COATED ORAL DAILY
COMMUNITY
Start: 2022-10-13 | End: 2022-12-28 | Stop reason: DRUGHIGH

## 2022-12-27 RX ORDER — PHENTERMINE HYDROCHLORIDE 37.5 MG/1
37.5 TABLET ORAL EVERY MORNING
COMMUNITY
Start: 2022-10-11 | End: 2022-12-28

## 2022-12-28 ENCOUNTER — OFFICE VISIT (OUTPATIENT)
Dept: PSYCHIATRY | Facility: CLINIC | Age: 52
End: 2022-12-28
Payer: COMMERCIAL

## 2022-12-28 VITALS
DIASTOLIC BLOOD PRESSURE: 98 MMHG | SYSTOLIC BLOOD PRESSURE: 149 MMHG | WEIGHT: 237.75 LBS | HEART RATE: 86 BPM | HEIGHT: 76 IN | BODY MASS INDEX: 28.95 KG/M2

## 2022-12-28 DIAGNOSIS — F17.210 CIGARETTE SMOKER: ICD-10-CM

## 2022-12-28 DIAGNOSIS — F41.1 GENERALIZED ANXIETY DISORDER: ICD-10-CM

## 2022-12-28 DIAGNOSIS — F31.4 BIPOLAR DISORDER, CURRENT EPISODE DEPRESSED, SEVERE, WITHOUT PSYCHOTIC FEATURES: Primary | ICD-10-CM

## 2022-12-28 DIAGNOSIS — F10.10 ALCOHOL ABUSE: ICD-10-CM

## 2022-12-28 DIAGNOSIS — F51.05 INSOMNIA DUE TO MENTAL DISORDER: ICD-10-CM

## 2022-12-28 PROBLEM — F43.21 SITUATIONAL DEPRESSION: Status: RESOLVED | Noted: 2022-07-18 | Resolved: 2022-12-28

## 2022-12-28 PROBLEM — F31.30 BIPOLAR AFFECTIVE DISORDER, CURRENT EPISODE DEPRESSED: Status: ACTIVE | Noted: 2022-07-18

## 2022-12-28 PROCEDURE — 3077F SYST BP >= 140 MM HG: CPT | Mod: CPTII,S$GLB,, | Performed by: STUDENT IN AN ORGANIZED HEALTH CARE EDUCATION/TRAINING PROGRAM

## 2022-12-28 PROCEDURE — 99407 PR TOBACCO USE CESSATION INTENSIVE >10 MINUTES: ICD-10-PCS | Mod: S$GLB,,, | Performed by: STUDENT IN AN ORGANIZED HEALTH CARE EDUCATION/TRAINING PROGRAM

## 2022-12-28 PROCEDURE — 1159F PR MEDICATION LIST DOCUMENTED IN MEDICAL RECORD: ICD-10-PCS | Mod: CPTII,S$GLB,, | Performed by: STUDENT IN AN ORGANIZED HEALTH CARE EDUCATION/TRAINING PROGRAM

## 2022-12-28 PROCEDURE — 1160F RVW MEDS BY RX/DR IN RCRD: CPT | Mod: CPTII,S$GLB,, | Performed by: STUDENT IN AN ORGANIZED HEALTH CARE EDUCATION/TRAINING PROGRAM

## 2022-12-28 PROCEDURE — 99205 OFFICE O/P NEW HI 60 MIN: CPT | Mod: 25,S$GLB,, | Performed by: STUDENT IN AN ORGANIZED HEALTH CARE EDUCATION/TRAINING PROGRAM

## 2022-12-28 PROCEDURE — 3008F PR BODY MASS INDEX (BMI) DOCUMENTED: ICD-10-PCS | Mod: CPTII,S$GLB,, | Performed by: STUDENT IN AN ORGANIZED HEALTH CARE EDUCATION/TRAINING PROGRAM

## 2022-12-28 PROCEDURE — 3008F BODY MASS INDEX DOCD: CPT | Mod: CPTII,S$GLB,, | Performed by: STUDENT IN AN ORGANIZED HEALTH CARE EDUCATION/TRAINING PROGRAM

## 2022-12-28 PROCEDURE — 3080F DIAST BP >= 90 MM HG: CPT | Mod: CPTII,S$GLB,, | Performed by: STUDENT IN AN ORGANIZED HEALTH CARE EDUCATION/TRAINING PROGRAM

## 2022-12-28 PROCEDURE — 99999 PR PBB SHADOW E&M-EST. PATIENT-LVL V: ICD-10-PCS | Mod: PBBFAC,,, | Performed by: STUDENT IN AN ORGANIZED HEALTH CARE EDUCATION/TRAINING PROGRAM

## 2022-12-28 PROCEDURE — 1160F PR REVIEW ALL MEDS BY PRESCRIBER/CLIN PHARMACIST DOCUMENTED: ICD-10-PCS | Mod: CPTII,S$GLB,, | Performed by: STUDENT IN AN ORGANIZED HEALTH CARE EDUCATION/TRAINING PROGRAM

## 2022-12-28 PROCEDURE — 3080F PR MOST RECENT DIASTOLIC BLOOD PRESSURE >= 90 MM HG: ICD-10-PCS | Mod: CPTII,S$GLB,, | Performed by: STUDENT IN AN ORGANIZED HEALTH CARE EDUCATION/TRAINING PROGRAM

## 2022-12-28 PROCEDURE — 1159F MED LIST DOCD IN RCRD: CPT | Mod: CPTII,S$GLB,, | Performed by: STUDENT IN AN ORGANIZED HEALTH CARE EDUCATION/TRAINING PROGRAM

## 2022-12-28 PROCEDURE — 4010F PR ACE/ARB THEARPY RXD/TAKEN: ICD-10-PCS | Mod: CPTII,S$GLB,, | Performed by: STUDENT IN AN ORGANIZED HEALTH CARE EDUCATION/TRAINING PROGRAM

## 2022-12-28 PROCEDURE — 99205 PR OFFICE/OUTPT VISIT, NEW, LEVL V, 60-74 MIN: ICD-10-PCS | Mod: 25,S$GLB,, | Performed by: STUDENT IN AN ORGANIZED HEALTH CARE EDUCATION/TRAINING PROGRAM

## 2022-12-28 PROCEDURE — 99407 BEHAV CHNG SMOKING > 10 MIN: CPT | Mod: S$GLB,,, | Performed by: STUDENT IN AN ORGANIZED HEALTH CARE EDUCATION/TRAINING PROGRAM

## 2022-12-28 PROCEDURE — 3077F PR MOST RECENT SYSTOLIC BLOOD PRESSURE >= 140 MM HG: ICD-10-PCS | Mod: CPTII,S$GLB,, | Performed by: STUDENT IN AN ORGANIZED HEALTH CARE EDUCATION/TRAINING PROGRAM

## 2022-12-28 PROCEDURE — 99999 PR PBB SHADOW E&M-EST. PATIENT-LVL V: CPT | Mod: PBBFAC,,, | Performed by: STUDENT IN AN ORGANIZED HEALTH CARE EDUCATION/TRAINING PROGRAM

## 2022-12-28 PROCEDURE — 96136 PR PSYCH/NEUROPSYCH TEST ADMIN/SCORING, 2+ TESTS, 1ST 30 MIN: ICD-10-PCS | Mod: S$GLB,,, | Performed by: STUDENT IN AN ORGANIZED HEALTH CARE EDUCATION/TRAINING PROGRAM

## 2022-12-28 PROCEDURE — 3044F PR MOST RECENT HEMOGLOBIN A1C LEVEL <7.0%: ICD-10-PCS | Mod: CPTII,S$GLB,, | Performed by: STUDENT IN AN ORGANIZED HEALTH CARE EDUCATION/TRAINING PROGRAM

## 2022-12-28 PROCEDURE — 96136 PSYCL/NRPSYC TST PHY/QHP 1ST: CPT | Mod: S$GLB,,, | Performed by: STUDENT IN AN ORGANIZED HEALTH CARE EDUCATION/TRAINING PROGRAM

## 2022-12-28 PROCEDURE — 4010F ACE/ARB THERAPY RXD/TAKEN: CPT | Mod: CPTII,S$GLB,, | Performed by: STUDENT IN AN ORGANIZED HEALTH CARE EDUCATION/TRAINING PROGRAM

## 2022-12-28 PROCEDURE — 3044F HG A1C LEVEL LT 7.0%: CPT | Mod: CPTII,S$GLB,, | Performed by: STUDENT IN AN ORGANIZED HEALTH CARE EDUCATION/TRAINING PROGRAM

## 2022-12-28 RX ORDER — LURASIDONE HYDROCHLORIDE 80 MG/1
80 TABLET, FILM COATED ORAL NIGHTLY
Qty: 30 TABLET | Refills: 1 | Status: SHIPPED | OUTPATIENT
Start: 2022-12-28 | End: 2023-01-06 | Stop reason: DRUGHIGH

## 2022-12-28 RX ORDER — IBUPROFEN 200 MG
1 TABLET ORAL DAILY
Qty: 14 PATCH | Refills: 0 | Status: SHIPPED | OUTPATIENT
Start: 2022-12-28 | End: 2023-01-11

## 2022-12-28 RX ORDER — TRAZODONE HYDROCHLORIDE 50 MG/1
50 TABLET ORAL NIGHTLY
Qty: 30 TABLET | Refills: 1 | Status: SHIPPED | OUTPATIENT
Start: 2022-12-28 | End: 2023-01-20 | Stop reason: DRUGHIGH

## 2022-12-28 RX ORDER — IBUPROFEN 200 MG
1 TABLET ORAL DAILY
Qty: 42 PATCH | Refills: 0 | Status: SHIPPED | OUTPATIENT
Start: 2022-12-28 | End: 2023-02-09

## 2022-12-28 RX ORDER — NALTREXONE HYDROCHLORIDE 50 MG/1
50 TABLET, FILM COATED ORAL DAILY
Qty: 30 TABLET | Refills: 0 | Status: SHIPPED | OUTPATIENT
Start: 2022-12-28 | End: 2023-01-20 | Stop reason: SDUPTHER

## 2022-12-28 RX ORDER — NICOTINE 7MG/24HR
1 PATCH, TRANSDERMAL 24 HOURS TRANSDERMAL DAILY
Qty: 14 PATCH | Refills: 0 | Status: SHIPPED | OUTPATIENT
Start: 2022-12-28 | End: 2023-01-11

## 2022-12-28 RX ORDER — CHLORDIAZEPOXIDE HYDROCHLORIDE 25 MG/1
CAPSULE, GELATIN COATED ORAL
Qty: 48 CAPSULE | Refills: 0 | Status: SHIPPED | OUTPATIENT
Start: 2022-12-28 | End: 2023-01-06

## 2022-12-28 RX ORDER — CHLORDIAZEPOXIDE HYDROCHLORIDE 25 MG/1
50 CAPSULE, GELATIN COATED ORAL NIGHTLY PRN
Qty: 60 CAPSULE | Refills: 0 | Status: SHIPPED | OUTPATIENT
Start: 2022-12-28 | End: 2023-01-06

## 2022-12-28 NOTE — PROGRESS NOTES
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 educational material in the AVS.  Discussed with patient MELATONIN use. Pt was informed that using MELATONIN increases risk for worsening depression. Informed patient of responsible use and reasonable indications to use MELATONIN. Educated the patient about the lack of FDA oversight of OTC supplements like MELATONIN, and informed the patient that the dose advertised on the bottle has been found to be inaccurate for many manufacturers. Advised patient to stop using MELATONIN.  Referral:  Referral for individual psychotherapy  Referral for smoking cessation program  Labs:  Vitamin B12 (Relevant risk factor(s) for deficiency or insufficiency: alcohol use, depression, erectile dysfunction, fatigability, and low energy)  Vitamin D (Relevant risk factor(s) for deficiency or insufficiency: depressed mood, insufficient sunlight exposure, nonspecific musculoskeletal pains, overweight BMI, poor diet, and sleep changes)  Monitor:  Monitor GAD7 score  Monitor PHQ9 score  Consider MDI  Monitor tobacco/nicotine use  Monitor benzo use  Monitor alcohol use - Will consider starting a drinking diary at a future encounter.  Monitor sleep - Will consider starting a sleep diary at a future encounter.  Monitor vitals, especially weight and BP  Monitor labs (see above)  The treatment plan and followup plan were reviewed with the patient  Pt understands to contact clinic  "if symptoms worsen, and to call 911 or go to nearest ER for suicidal ideation, intent or plan.      Return: 1 week    Billing Documentation    Method of Encounter IN PERSON visit at the clinic   Type of Encounter New patient to me, NOT seen by department within last 3 years   Counseling;  Psychotherapy Not applicable: Not done or UNDER 16 minutes   Counseling;  Tobacco and/or Nicotine 73286: 11 minutes (with payable diagnosis code "F17.210 - Nicotine dependence, cigarettes, uncomplicated ")   Additional Codes and Modifiers 25: tobacco/nicotine (with 14760 or 96629)  99133: administered and scored more than one psychological or neuropsychological tests (GAD7, MDQ, PHQ9, and PSS-10) (20 mins)   Time Remaining Chart/Pt 76806: NOT seen by department within last 3 years, new patient to me, 74 minutes   Total Mins  (12/28/2022) 105        Ciaran Prather DO  Department of Psychiatry    "

## 2022-12-28 NOTE — PATIENT INSTRUCTIONS
"Discontinue DOXEPIN  Discontinue LAMICTAL  Discontinue MELATONIN  Talk to your doctor about whether switching from a beta blocker to another antihypertensive is an option    Start TRAZODONE 50mg nightly  Increase LATUDA from 60mg daily to 80mg nightly    Begin treatment of ALCOHOL withdrawal with LIBRIUM  Day 1: 50 to 100 mg every 4 to 6 hours as needed based on symptoms*   Day 2: 50 to 100 mg every 6 to 8 hours as needed   Day 3: 50 to 100 mg every 12 hours as needed   Day 4 and onward: 50 mg at bedtime as needed  *--These symptoms include pulse rate greater than 90 per minute, diastolic blood pressure greater than 90 mm Hg or signs of withdrawal.    After LIBRIUM TAPER, START NALTREXONE    Start NALTREXONE PO 50mg daily  "Individuals who abstain from alcohol for several days prior to initiating treatment with naltrexone may have greater reductions in the number of drinking days as well as number of heavy drinking days, and may also be more likely to abstain completely throughout treatment"    Please complete lab work at your earliest convenience. Your labs are NON-FASTING.     Ask your ex girlfriend to hold onto firearms    Look into rejoining support groups    Chat with Lesvia for a few minutes everyday    Referral to therapist    Referral to smoking cessation program    Start Nicotine Replacement Therapy  Place one patch onto the skin once daily. The next day, remove it and replace it with a new patch. Use this taper schedule:  Weeks 1-6: 21mg  Weeks 7-8: 14mg  Weeks 9-10: 7mg  Weeks 11+: Stop using patches    "

## 2022-12-29 ENCOUNTER — PATIENT MESSAGE (OUTPATIENT)
Dept: PSYCHIATRY | Facility: CLINIC | Age: 52
End: 2022-12-29
Payer: COMMERCIAL

## 2022-12-29 NOTE — TELEPHONE ENCOUNTER
Contacted the pharmacy. The Nicoderm patches were approved by insurance but need to be ordered. They will be ready for  tomorrow. They filled the Rx Librium 25 mg take 2 caps nightly as needed. Not able to also fill Librium 25 mg with detox instructions. Please advise.

## 2022-12-30 ENCOUNTER — PATIENT MESSAGE (OUTPATIENT)
Dept: PSYCHIATRY | Facility: CLINIC | Age: 52
End: 2022-12-30
Payer: COMMERCIAL

## 2022-12-30 ENCOUNTER — LAB VISIT (OUTPATIENT)
Dept: LAB | Facility: HOSPITAL | Age: 52
End: 2022-12-30
Attending: STUDENT IN AN ORGANIZED HEALTH CARE EDUCATION/TRAINING PROGRAM
Payer: COMMERCIAL

## 2022-12-30 DIAGNOSIS — F51.05 INSOMNIA DUE TO MENTAL DISORDER: ICD-10-CM

## 2022-12-30 DIAGNOSIS — F31.4 BIPOLAR DISORDER, CURRENT EPISODE DEPRESSED, SEVERE, WITHOUT PSYCHOTIC FEATURES: ICD-10-CM

## 2022-12-30 LAB — VIT B12 SERPL-MCNC: 398 PG/ML (ref 210–950)

## 2022-12-30 PROCEDURE — 82652 VIT D 1 25-DIHYDROXY: CPT | Performed by: STUDENT IN AN ORGANIZED HEALTH CARE EDUCATION/TRAINING PROGRAM

## 2022-12-30 PROCEDURE — 82607 VITAMIN B-12: CPT | Performed by: STUDENT IN AN ORGANIZED HEALTH CARE EDUCATION/TRAINING PROGRAM

## 2023-01-05 LAB — 1,25(OH)2D3 SERPL-MCNC: 152 PG/ML (ref 20–79)

## 2023-01-06 ENCOUNTER — OFFICE VISIT (OUTPATIENT)
Dept: PSYCHIATRY | Facility: CLINIC | Age: 53
End: 2023-01-06
Payer: COMMERCIAL

## 2023-01-06 VITALS
SYSTOLIC BLOOD PRESSURE: 135 MMHG | BODY MASS INDEX: 28.78 KG/M2 | DIASTOLIC BLOOD PRESSURE: 97 MMHG | WEIGHT: 236.31 LBS | HEART RATE: 83 BPM | HEIGHT: 76 IN

## 2023-01-06 DIAGNOSIS — F31.4 BIPOLAR DISORDER, CURRENT EPISODE DEPRESSED, SEVERE, WITHOUT PSYCHOTIC FEATURES: Primary | ICD-10-CM

## 2023-01-06 DIAGNOSIS — F10.10 ALCOHOL ABUSE: ICD-10-CM

## 2023-01-06 DIAGNOSIS — F51.05 INSOMNIA DUE TO MENTAL DISORDER: ICD-10-CM

## 2023-01-06 DIAGNOSIS — F41.1 GENERALIZED ANXIETY DISORDER: ICD-10-CM

## 2023-01-06 DIAGNOSIS — F17.210 CIGARETTE SMOKER: ICD-10-CM

## 2023-01-06 PROCEDURE — 3075F PR MOST RECENT SYSTOLIC BLOOD PRESS GE 130-139MM HG: ICD-10-PCS | Mod: CPTII,S$GLB,, | Performed by: STUDENT IN AN ORGANIZED HEALTH CARE EDUCATION/TRAINING PROGRAM

## 2023-01-06 PROCEDURE — 99215 OFFICE O/P EST HI 40 MIN: CPT | Mod: S$GLB,,, | Performed by: STUDENT IN AN ORGANIZED HEALTH CARE EDUCATION/TRAINING PROGRAM

## 2023-01-06 PROCEDURE — 3075F SYST BP GE 130 - 139MM HG: CPT | Mod: CPTII,S$GLB,, | Performed by: STUDENT IN AN ORGANIZED HEALTH CARE EDUCATION/TRAINING PROGRAM

## 2023-01-06 PROCEDURE — 96136 PR PSYCH/NEUROPSYCH TEST ADMIN/SCORING, 2+ TESTS, 1ST 30 MIN: ICD-10-PCS | Mod: S$GLB,,, | Performed by: STUDENT IN AN ORGANIZED HEALTH CARE EDUCATION/TRAINING PROGRAM

## 2023-01-06 PROCEDURE — 99215 PR OFFICE/OUTPT VISIT, EST, LEVL V, 40-54 MIN: ICD-10-PCS | Mod: S$GLB,,, | Performed by: STUDENT IN AN ORGANIZED HEALTH CARE EDUCATION/TRAINING PROGRAM

## 2023-01-06 PROCEDURE — 3008F BODY MASS INDEX DOCD: CPT | Mod: CPTII,S$GLB,, | Performed by: STUDENT IN AN ORGANIZED HEALTH CARE EDUCATION/TRAINING PROGRAM

## 2023-01-06 PROCEDURE — 3080F PR MOST RECENT DIASTOLIC BLOOD PRESSURE >= 90 MM HG: ICD-10-PCS | Mod: CPTII,S$GLB,, | Performed by: STUDENT IN AN ORGANIZED HEALTH CARE EDUCATION/TRAINING PROGRAM

## 2023-01-06 PROCEDURE — 1160F RVW MEDS BY RX/DR IN RCRD: CPT | Mod: CPTII,S$GLB,, | Performed by: STUDENT IN AN ORGANIZED HEALTH CARE EDUCATION/TRAINING PROGRAM

## 2023-01-06 PROCEDURE — 1159F PR MEDICATION LIST DOCUMENTED IN MEDICAL RECORD: ICD-10-PCS | Mod: CPTII,S$GLB,, | Performed by: STUDENT IN AN ORGANIZED HEALTH CARE EDUCATION/TRAINING PROGRAM

## 2023-01-06 PROCEDURE — 99999 PR PBB SHADOW E&M-EST. PATIENT-LVL III: CPT | Mod: PBBFAC,,, | Performed by: STUDENT IN AN ORGANIZED HEALTH CARE EDUCATION/TRAINING PROGRAM

## 2023-01-06 PROCEDURE — 1160F PR REVIEW ALL MEDS BY PRESCRIBER/CLIN PHARMACIST DOCUMENTED: ICD-10-PCS | Mod: CPTII,S$GLB,, | Performed by: STUDENT IN AN ORGANIZED HEALTH CARE EDUCATION/TRAINING PROGRAM

## 2023-01-06 PROCEDURE — 99999 PR PBB SHADOW E&M-EST. PATIENT-LVL III: ICD-10-PCS | Mod: PBBFAC,,, | Performed by: STUDENT IN AN ORGANIZED HEALTH CARE EDUCATION/TRAINING PROGRAM

## 2023-01-06 PROCEDURE — 3008F PR BODY MASS INDEX (BMI) DOCUMENTED: ICD-10-PCS | Mod: CPTII,S$GLB,, | Performed by: STUDENT IN AN ORGANIZED HEALTH CARE EDUCATION/TRAINING PROGRAM

## 2023-01-06 PROCEDURE — 1159F MED LIST DOCD IN RCRD: CPT | Mod: CPTII,S$GLB,, | Performed by: STUDENT IN AN ORGANIZED HEALTH CARE EDUCATION/TRAINING PROGRAM

## 2023-01-06 PROCEDURE — 96136 PSYCL/NRPSYC TST PHY/QHP 1ST: CPT | Mod: S$GLB,,, | Performed by: STUDENT IN AN ORGANIZED HEALTH CARE EDUCATION/TRAINING PROGRAM

## 2023-01-06 PROCEDURE — 3080F DIAST BP >= 90 MM HG: CPT | Mod: CPTII,S$GLB,, | Performed by: STUDENT IN AN ORGANIZED HEALTH CARE EDUCATION/TRAINING PROGRAM

## 2023-01-06 RX ORDER — LURASIDONE HYDROCHLORIDE 40 MG/1
40 TABLET, FILM COATED ORAL DAILY
Qty: 30 TABLET | Refills: 1 | Status: SHIPPED | OUTPATIENT
Start: 2023-01-06 | End: 2023-01-20 | Stop reason: DRUGHIGH

## 2023-01-06 NOTE — PATIENT INSTRUCTIONS
Start NALTREXONE  Decrease LATUDA to 40mg daily  Discontinue LIBRIUM  Continue TRAZODONE as prescribed  Please complete lab work at your earliest convenience. Your labs are NON-FASTING.

## 2023-01-06 NOTE — PROGRESS NOTES
Outpatient Psychiatry Followup Visit (MD/NP)    1/6/2023    Georgette Reyes, a 52 y.o. male, presenting for followup visit. Met with patient.    Reason for Encounter:     Medication management followup.    Assessment - Diagnosis - Goals:     Impression:      ICD-10-CM ICD-9-CM   1. Bipolar disorder, current episode depressed, severe, without psychotic features  F31.4 296.53   2. Generalized anxiety disorder  F41.1 300.02   3. Insomnia due to mental disorder  F51.05 300.9     327.02   4. Cigarette smoker  F17.210 305.1   5. Alcohol abuse  F10.10 305.00   6. BMI 28.0-28.9,adult  Z68.28 V85.24       See most recent case formulation.    Adherence Risk Assessment: Patient's overall ability to adhere to the treatment plan is good. Barriers to adherence to treatment are unclear. Barriers to adherence may include patient related factors (confusion, carelessness or forgetfulness) and poor measures of perceived self efficacy and/or helplessness (PSS-10). Strategies to improve adherence may include addressing potential barriers and reducing risks for nonadherence (adjusting frequency of doses, adjusting frequency of visits, using a long-acting injectable medication, integrating the pt preferences, counseling and psychoeducation, addressing patient expectations, addressing motivation, addressing hope, etc.).     Treatment Plan/Recommendations:   Chart reviewed.  Medication management: The risks and benefits of medication were discussed with the patient.  Psychotropic history includes AMBIEN, DOXEPIN, LAMICTAL, LATUDA, LIBRIUM, RESTORIL, TRAZODONE, WELLBUTRIN, and ZOLOFT.  Pt's use of a BETA-BLOCKER is potentially worsening depression  Continue TRAZODONE  Chosen for insomnia and depression  Titration:  28DEC2022: Start 50mg HS  Prior to evaluation pt had been taking LATUDA and DOXEPIN  Decrease LATUDA  Medication adjustments:  06JAN2023: Reduce to 40mg daily  [id number]: Increase to 80mg daily  Prior to evaluation pt had been taking  "60mg daily  Discontinue LIBRIUM   Protocol completed.  Start NALTREXONE  CMP ordered 06JAN2023  Start AFTER LIBRIUM TAPER and after several days without ETOH  Medication Adjustments:  06JAN2023: Start 50mg daily  [id number]: Start 50mg daily (noninitiation)  Restart NRT (10 wk TD patch taper: 21mg/d for 6w, 14mg/d for 2w, 7mg/d for 2w)  Counseling, support and psychoeducation provided.  Patient agrees to ask ex-girlfriend to confiscate his firearms for now - completed 06JAN2023  Encouraged rejoining support groups (AA or similar) - completed 06JAN2023  Referral:  Referral for individual psychotherapy (PENDING)  Referral for smoking cessation program (PENDING)  Labs:  Liver function testing with blood chemistry: CMP  Hematology: CBC  Monitor:  GAD7 and PHQ9  MDI  PSS10  Monitor benzo use  Monitor alcohol use - Sober since 29DEC2022   Monitor vitals, especially weight and BP  Monitor labs (see above)  The treatment plan and followup plan were reviewed with the patient  Pt understands to contact clinic if symptoms worsen, and to call 911 or go to nearest ER for suicidal ideation, intent or plan.    Return: 2 weeks, CLINIC ONLY (not virtual), for medication management only    Interval History:     Pt is pleasant and cooperative on interview. This visit was done in person in the clinic.    Last took LIBRIUM and LATUDA on Wednesday. Did not take yesterday or today because was too sedated and "was feeling like a zombie." Reports sedation has improved since Wednesday. Sober since 29DEC2022    No cravings for alcohol. Attending AA meetings.     No grogginess in the morning.    Took DOXEPIN with TRAZODONE.    Requests lowering LATUDA.    Guns are out of the house.    Pt reports stress is stable.    Mood is about the same. Anxiety is a bit less.    Pt reports sleep as good.    Patient did not display signs of nor endorse symptoms of overt psychosis or acute mood disorder requiring hospitalization during the encounter. Patient " "denied violent thoughts or suicidal or homicidal ideation, intent, or plan.    Instruments:     Baseline MDI done today. GAD7 is stable, from SEVERE 21/21 last time to this visit SEVERE 17/21 (5-10-15 cutoffs). PHQ9 is stable, from SEVERE 27/27 last time to this visit SEVERE 27/27 (7-15-21 cutoffs). PSS-10 is stable, from HIGH 36/40 last time to this visit HIGH 27/40 (14-27 cutoffs).    GAD7 Interpretation: 17/21 (1/6/2023); SEVERE using 5-10-15 cutoffs.    MDI Interpretation: 43/50 (1/6/2023); SEVERE DEPRESSION SEVERITY using 21-26-31 cutoffs.    PHQ9 Interpretation: 24/27 (1/6/2023); SEVERE using 7-15-21 cutoffs (scored 21 or more).    PSS10 Interpretation: 27/40 (1/6/2023); HIGH using 14-27 cutoffs., 5/6 perceived helplessness     Review of Systems:     Pertinent items are noted in HPI.    Functioning in Relationships:  Spouse/partner: N/A  Peers: interpersonal deficits noted  Employers: strained    Current Evaluation:       Nutritional Screening: Considering the patient's height and weight, medications, medical history and preferences, should a referral be made to the dietitian? not at this time    Constitutional  Vitals:  Most recent vital signs, dated less 90 days prior to this appointment, were reviewed.    Vitals:    01/06/23 1539   BP: (!) 135/97   Pulse: 83   Weight: 107.2 kg (236 lb 5.3 oz)   Height: 6' 4" (1.93 m)        General:  unremarkable, age appropriate     Musculoskeletal  Muscle Strength/Tone:  No tremor   Gait & Station:  non-ataxic     Psychiatric  Appearance: Dress is informal but appropriate.   Motor Activity: Normal.  Speech, Language Associations and Form of Thought: Clear, normal rate and volume. Associations intact. No tangentiality or thought blocking. Orderly thought process.   Mood and Affect: Mood is depressed. Affect is constricted.  Content of Thought: Normal, no suicidality, no homicidality, delusions, or paranoia   Perceptions: No hallucinations.  Orientation: Grossly " intact.  Memory: Intact for content of interview.  Attention Span & Concentration: Able to focus for interview.  Fund of Knowledge: Intact and appropriate to age and level of education.  Insight: Intact.  Judgment: Behavior is adequate to circumstances.    Relevant Elements of Neurological Exam: normal gait    Laboratory Data  Lab Visit on 12/30/2022   Component Date Value Ref Range Status    Vit D, 1,25-Dihydroxy 12/30/2022 152 (H)  20 - 79 pg/mL Final    Vitamin B-12 12/30/2022 398  210 - 950 pg/mL Final       Medications  Outpatient Encounter Medications as of 1/6/2023   Medication Sig Dispense Refill    hydroCHLOROthiazide (HYDRODIURIL) 25 MG tablet Take 1 tablet (25 mg total) by mouth once daily. 90 tablet 3    losartan (COZAAR) 25 MG tablet Take 1 tablet (25 mg total) by mouth once daily. 90 tablet 3    naltrexone (DEPADE) 50 mg tablet Take 50 mg by mouth once daily. 30 tablet 0    nebivoloL (BYSTOLIC) 20 mg Tab Take 20 mg by mouth once daily at 6am. 90 tablet 3    nicotine (NICODERM CQ) 14 mg/24 hr Place 1 patch onto the skin once daily. Reapply 14mg patch every day for weeks 7-8. for 14 days 14 patch 0    nicotine (NICODERM CQ) 21 mg/24 hr Place 1 patch onto the skin once daily. Reapply 21mg patch every day for weeks 1-6. 42 patch 0    nicotine (NICODERM CQ) 7 mg/24 hr Place 1 patch onto the skin once daily. Reapply 7mg patch every day for weeks 9-10. for 14 days 14 patch 0    sildenafil (REVATIO) 20 mg Tab Take 1-3 tablets (20-60 mg total) by mouth daily as needed (erectile dysfunction). 60 tablet 2    traZODone (DESYREL) 50 MG tablet Take 1 tablet (50 mg total) by mouth every evening. 30 tablet 1    [DISCONTINUED] chlordiazepoxide (LIBRIUM) 25 MG Cap Day 1: 50 to 100 mg every 4 to 6 hours as needed based on symptoms, Day 2: 50 to 100 mg every 6 to 8 hours as needed, Day 3: 50 to 100 mg every 12 hours as needed 48 capsule 0    [DISCONTINUED] chlordiazepoxide (LIBRIUM) 25 MG Cap Take 2 capsules (50 mg total)  by mouth nightly as needed (insomnia). 60 capsule 0    [DISCONTINUED] lurasidone (LATUDA) 80 mg Tab tablet Take 1 tablet (80 mg total) by mouth every evening. 30 tablet 1    lurasidone (LATUDA) 40 mg Tab tablet Take 1 tablet (40 mg total) by mouth once daily. 30 tablet 1     No facility-administered encounter medications on file as of 1/6/2023.         Billing Documentation:     Method of Encounter IN PERSON visit at the clinic   Type of Encounter Follow up visit with me   Counseling;  Psychotherapy Not applicable: Not done or UNDER 16 minutes   Counseling;  Tobacco and/or Nicotine Not applicable: Not done or UNDER 3 minutes   Additional Codes and Modifiers 00237: administered and scored more than one psychological or neuropsychological tests (GAD7, MDI, PHQ9, and PSS-10) (16 mins)   Time Remaining Chart/Pt 47464: FOLLOW UP VISIT 54 minutes   Total Mins  (1/6/2023) 070        Ciaran Prather DO  Department of Psychiatry

## 2023-01-11 ENCOUNTER — LAB VISIT (OUTPATIENT)
Dept: LAB | Facility: HOSPITAL | Age: 53
End: 2023-01-11
Attending: STUDENT IN AN ORGANIZED HEALTH CARE EDUCATION/TRAINING PROGRAM
Payer: COMMERCIAL

## 2023-01-11 DIAGNOSIS — F31.4 BIPOLAR DISORDER, CURRENT EPISODE DEPRESSED, SEVERE, WITHOUT PSYCHOTIC FEATURES: ICD-10-CM

## 2023-01-11 LAB
ALBUMIN SERPL BCP-MCNC: 3.7 G/DL (ref 3.5–5.2)
ALP SERPL-CCNC: 84 U/L (ref 55–135)
ALT SERPL W/O P-5'-P-CCNC: 54 U/L (ref 10–44)
ANION GAP SERPL CALC-SCNC: 11 MMOL/L (ref 8–16)
AST SERPL-CCNC: 35 U/L (ref 10–40)
BASOPHILS # BLD AUTO: 0.05 K/UL (ref 0–0.2)
BASOPHILS NFR BLD: 0.7 % (ref 0–1.9)
BILIRUB SERPL-MCNC: 0.6 MG/DL (ref 0.1–1)
BUN SERPL-MCNC: 15 MG/DL (ref 6–20)
CALCIUM SERPL-MCNC: 9.5 MG/DL (ref 8.7–10.5)
CHLORIDE SERPL-SCNC: 105 MMOL/L (ref 95–110)
CO2 SERPL-SCNC: 22 MMOL/L (ref 23–29)
CREAT SERPL-MCNC: 1.6 MG/DL (ref 0.5–1.4)
DIFFERENTIAL METHOD: ABNORMAL
EOSINOPHIL # BLD AUTO: 0.1 K/UL (ref 0–0.5)
EOSINOPHIL NFR BLD: 0.7 % (ref 0–8)
ERYTHROCYTE [DISTWIDTH] IN BLOOD BY AUTOMATED COUNT: 11.9 % (ref 11.5–14.5)
EST. GFR  (NO RACE VARIABLE): 50.9 ML/MIN/1.73 M^2
GLUCOSE SERPL-MCNC: 89 MG/DL (ref 70–110)
HCT VFR BLD AUTO: 36.8 % (ref 40–54)
HGB BLD-MCNC: 13.1 G/DL (ref 14–18)
IMM GRANULOCYTES # BLD AUTO: 0.02 K/UL (ref 0–0.04)
IMM GRANULOCYTES NFR BLD AUTO: 0.3 % (ref 0–0.5)
LYMPHOCYTES # BLD AUTO: 2.1 K/UL (ref 1–4.8)
LYMPHOCYTES NFR BLD: 29.2 % (ref 18–48)
MCH RBC QN AUTO: 34.8 PG (ref 27–31)
MCHC RBC AUTO-ENTMCNC: 35.6 G/DL (ref 32–36)
MCV RBC AUTO: 98 FL (ref 82–98)
MONOCYTES # BLD AUTO: 0.6 K/UL (ref 0.3–1)
MONOCYTES NFR BLD: 8.6 % (ref 4–15)
NEUTROPHILS # BLD AUTO: 4.4 K/UL (ref 1.8–7.7)
NEUTROPHILS NFR BLD: 60.5 % (ref 38–73)
NRBC BLD-RTO: 0 /100 WBC
PLATELET # BLD AUTO: 228 K/UL (ref 150–450)
PMV BLD AUTO: 9.4 FL (ref 9.2–12.9)
POTASSIUM SERPL-SCNC: 4.4 MMOL/L (ref 3.5–5.1)
PROT SERPL-MCNC: 6.3 G/DL (ref 6–8.4)
RBC # BLD AUTO: 3.76 M/UL (ref 4.6–6.2)
SODIUM SERPL-SCNC: 138 MMOL/L (ref 136–145)
WBC # BLD AUTO: 7.32 K/UL (ref 3.9–12.7)

## 2023-01-11 PROCEDURE — 85025 COMPLETE CBC W/AUTO DIFF WBC: CPT | Performed by: STUDENT IN AN ORGANIZED HEALTH CARE EDUCATION/TRAINING PROGRAM

## 2023-01-11 PROCEDURE — 80053 COMPREHEN METABOLIC PANEL: CPT | Performed by: STUDENT IN AN ORGANIZED HEALTH CARE EDUCATION/TRAINING PROGRAM

## 2023-01-11 PROCEDURE — 36415 COLL VENOUS BLD VENIPUNCTURE: CPT | Performed by: STUDENT IN AN ORGANIZED HEALTH CARE EDUCATION/TRAINING PROGRAM

## 2023-01-11 NOTE — PROGRESS NOTES
Results reviewed and pt notified by portal. Liver function improved since 5 months ago. Anemia and decrease in GFR noted. Not a sign of acute renal failure. No change in treatment required at this time but will repeat labs next encounter and recommend pt to follow up with primary care for further management of anemia and renal dysfunction.

## 2023-01-20 ENCOUNTER — OFFICE VISIT (OUTPATIENT)
Dept: PSYCHIATRY | Facility: CLINIC | Age: 53
End: 2023-01-20
Payer: COMMERCIAL

## 2023-01-20 VITALS
HEART RATE: 80 BPM | BODY MASS INDEX: 28.43 KG/M2 | HEIGHT: 76 IN | SYSTOLIC BLOOD PRESSURE: 121 MMHG | WEIGHT: 233.44 LBS | DIASTOLIC BLOOD PRESSURE: 83 MMHG

## 2023-01-20 DIAGNOSIS — F10.91 ALCOHOL USE DISORDER IN REMISSION: ICD-10-CM

## 2023-01-20 DIAGNOSIS — F51.05 INSOMNIA DUE TO MENTAL DISORDER: ICD-10-CM

## 2023-01-20 DIAGNOSIS — F41.1 GENERALIZED ANXIETY DISORDER: ICD-10-CM

## 2023-01-20 DIAGNOSIS — F31.31 BIPOLAR AFFECTIVE DISORDER, CURRENTLY DEPRESSED, MILD: Primary | ICD-10-CM

## 2023-01-20 DIAGNOSIS — F17.210 CIGARETTE SMOKER: ICD-10-CM

## 2023-01-20 PROCEDURE — 3008F BODY MASS INDEX DOCD: CPT | Mod: CPTII,S$GLB,, | Performed by: STUDENT IN AN ORGANIZED HEALTH CARE EDUCATION/TRAINING PROGRAM

## 2023-01-20 PROCEDURE — 1159F MED LIST DOCD IN RCRD: CPT | Mod: CPTII,S$GLB,, | Performed by: STUDENT IN AN ORGANIZED HEALTH CARE EDUCATION/TRAINING PROGRAM

## 2023-01-20 PROCEDURE — 3079F DIAST BP 80-89 MM HG: CPT | Mod: CPTII,S$GLB,, | Performed by: STUDENT IN AN ORGANIZED HEALTH CARE EDUCATION/TRAINING PROGRAM

## 2023-01-20 PROCEDURE — 96136 PR PSYCH/NEUROPSYCH TEST ADMIN/SCORING, 2+ TESTS, 1ST 30 MIN: ICD-10-PCS | Mod: S$GLB,,, | Performed by: STUDENT IN AN ORGANIZED HEALTH CARE EDUCATION/TRAINING PROGRAM

## 2023-01-20 PROCEDURE — 1159F PR MEDICATION LIST DOCUMENTED IN MEDICAL RECORD: ICD-10-PCS | Mod: CPTII,S$GLB,, | Performed by: STUDENT IN AN ORGANIZED HEALTH CARE EDUCATION/TRAINING PROGRAM

## 2023-01-20 PROCEDURE — 99999 PR PBB SHADOW E&M-EST. PATIENT-LVL III: CPT | Mod: PBBFAC,,, | Performed by: STUDENT IN AN ORGANIZED HEALTH CARE EDUCATION/TRAINING PROGRAM

## 2023-01-20 PROCEDURE — 99999 PR PBB SHADOW E&M-EST. PATIENT-LVL III: ICD-10-PCS | Mod: PBBFAC,,, | Performed by: STUDENT IN AN ORGANIZED HEALTH CARE EDUCATION/TRAINING PROGRAM

## 2023-01-20 PROCEDURE — 1160F PR REVIEW ALL MEDS BY PRESCRIBER/CLIN PHARMACIST DOCUMENTED: ICD-10-PCS | Mod: CPTII,S$GLB,, | Performed by: STUDENT IN AN ORGANIZED HEALTH CARE EDUCATION/TRAINING PROGRAM

## 2023-01-20 PROCEDURE — 99406 BEHAV CHNG SMOKING 3-10 MIN: CPT | Mod: S$GLB,,, | Performed by: STUDENT IN AN ORGANIZED HEALTH CARE EDUCATION/TRAINING PROGRAM

## 2023-01-20 PROCEDURE — 99406 PR TOBACCO USE CESSATION INTERMEDIATE 3-10 MINUTES: ICD-10-PCS | Mod: S$GLB,,, | Performed by: STUDENT IN AN ORGANIZED HEALTH CARE EDUCATION/TRAINING PROGRAM

## 2023-01-20 PROCEDURE — 1160F RVW MEDS BY RX/DR IN RCRD: CPT | Mod: CPTII,S$GLB,, | Performed by: STUDENT IN AN ORGANIZED HEALTH CARE EDUCATION/TRAINING PROGRAM

## 2023-01-20 PROCEDURE — 3074F SYST BP LT 130 MM HG: CPT | Mod: CPTII,S$GLB,, | Performed by: STUDENT IN AN ORGANIZED HEALTH CARE EDUCATION/TRAINING PROGRAM

## 2023-01-20 PROCEDURE — 96136 PSYCL/NRPSYC TST PHY/QHP 1ST: CPT | Mod: S$GLB,,, | Performed by: STUDENT IN AN ORGANIZED HEALTH CARE EDUCATION/TRAINING PROGRAM

## 2023-01-20 PROCEDURE — 3074F PR MOST RECENT SYSTOLIC BLOOD PRESSURE < 130 MM HG: ICD-10-PCS | Mod: CPTII,S$GLB,, | Performed by: STUDENT IN AN ORGANIZED HEALTH CARE EDUCATION/TRAINING PROGRAM

## 2023-01-20 PROCEDURE — 99215 OFFICE O/P EST HI 40 MIN: CPT | Mod: 25,S$GLB,, | Performed by: STUDENT IN AN ORGANIZED HEALTH CARE EDUCATION/TRAINING PROGRAM

## 2023-01-20 PROCEDURE — 99215 PR OFFICE/OUTPT VISIT, EST, LEVL V, 40-54 MIN: ICD-10-PCS | Mod: 25,S$GLB,, | Performed by: STUDENT IN AN ORGANIZED HEALTH CARE EDUCATION/TRAINING PROGRAM

## 2023-01-20 PROCEDURE — 3008F PR BODY MASS INDEX (BMI) DOCUMENTED: ICD-10-PCS | Mod: CPTII,S$GLB,, | Performed by: STUDENT IN AN ORGANIZED HEALTH CARE EDUCATION/TRAINING PROGRAM

## 2023-01-20 PROCEDURE — 3079F PR MOST RECENT DIASTOLIC BLOOD PRESSURE 80-89 MM HG: ICD-10-PCS | Mod: CPTII,S$GLB,, | Performed by: STUDENT IN AN ORGANIZED HEALTH CARE EDUCATION/TRAINING PROGRAM

## 2023-01-20 RX ORDER — NALTREXONE HYDROCHLORIDE 50 MG/1
50 TABLET, FILM COATED ORAL DAILY
Qty: 30 TABLET | Refills: 1 | Status: SHIPPED | OUTPATIENT
Start: 2023-01-20 | End: 2023-03-21

## 2023-01-20 RX ORDER — LURASIDONE HYDROCHLORIDE 20 MG/1
20 TABLET, FILM COATED ORAL NIGHTLY
Qty: 30 TABLET | Refills: 1 | Status: SHIPPED | OUTPATIENT
Start: 2023-01-20 | End: 2023-04-13 | Stop reason: SDUPTHER

## 2023-01-20 RX ORDER — TRAZODONE HYDROCHLORIDE 100 MG/1
100 TABLET ORAL NIGHTLY
Qty: 30 TABLET | Refills: 1 | Status: SHIPPED | OUTPATIENT
Start: 2023-01-20 | End: 2023-02-09 | Stop reason: DRUGHIGH

## 2023-01-20 NOTE — PATIENT INSTRUCTIONS
Decrease LATUDA to 20mg nightly  Increase TRAZODONE to 100mg nightly  Continue NALTREXONE 50mg daily  Smoke 18 cigarettes or less per day

## 2023-01-20 NOTE — PROGRESS NOTES
Outpatient Psychiatry Followup Visit (DO/MD/NP)    1/20/2023    Georgette Reyes, a 52 y.o. male, presenting for followup visit.     Reason for Encounter: Medication management. Met with patient, in person.     Assessment - Diagnosis - Goals:     Impression:      ICD-10-CM ICD-9-CM   1. Bipolar affective disorder, currently depressed, mild  F31.31 296.51   2. Generalized anxiety disorder  F41.1 300.02   3. Insomnia due to mental disorder  F51.05 300.9     327.02   4. Alcohol use disorder in remission  F10.91 305.03   5. Cigarette smoker  F17.210 305.1   6. BMI 28.0-28.9,adult  Z68.28 V85.24     See most recent case formulation.     Adherence Risk Assessment: Patient's overall ability to adhere to the treatment plan is good. Barriers to adherence to treatment are unclear. Barriers to adherence may include patient related factors (confusion, carelessness or forgetfulness) and poor measures of perceived self efficacy and/or helplessness (PSS-10). Strategies to improve adherence may include addressing potential barriers and reducing risks for nonadherence (adjusting frequency of doses, adjusting frequency of visits, using a long-acting injectable medication, integrating the pt preferences, counseling and psychoeducation, addressing patient expectations, addressing motivation, addressing hope, etc.).     Treatment Plan/Recommendations:   Chart reviewed.  Medication management: The risks and benefits of medication were discussed with the patient.  Psychotropic history includes AMBIEN, DOXEPIN, LAMICTAL, LATUDA, LIBRIUM, RESTORIL, TRAZODONE, WELLBUTRIN, and ZOLOFT.  Pt's use of a BETA-BLOCKER is potentially worsening depression  Considering mood stabilizers TRILEPTAL, LAMICTAL, LITHIUM  Increase TRAZODONE  Chosen for insomnia and depression  Titration:  20JAN2023: Increase to 100mg HS  [id number]: Start 50mg HS  Prior to evaluation pt had been taking LATUDA and DOXEPIN  Reduce LATUDA  To improve interest and to reduce blunting  of affect and anhedonia  Increase tone of Panksepp affective system SEEKING (expectancy).   Medication adjustments:  20JAN2023: Reduce to 20mg daily  06JAN2023: Reduce to 40mg daily  [id number]: Increase to 80mg daily  Prior to evaluation pt had been taking 60mg daily  Continue NALTREXONE  CMP ordered 06JAN2023; repeat next visit  Medication Adjustments:  06JAN2023: Start 50mg daily  [id number]: Start 50mg daily (noninitiation)  Continue NRT (10 wk TD patch taper: 21mg/d for 6w, 14mg/d for 2w, 7mg/d for 2w)  Counseling, support and psychoeducation provided.  Counseled on proper medication administration and adherence.   Discussed tobacco use. Using NRT. Currently 20 cig/d. New goal is 18/d.  Referral:  Referral for individual psychotherapy (PENDING)  Referral for smoking cessation program (PENDING)  Monitor:  GAD7 and PHQ9  MDI  PSS10  Monitor tobacco use - new goal is 18 cig/d (20JAN2023)  Monitor alcohol use - Sober since 29DEC2022   Monitor vitals, especially weight and BP  Monitor labs (Discussed results - LFTs improved; some anemia and worsening of GFR)  The treatment plan and followup plan were reviewed with the patient  Pt understands to contact clinic if symptoms worsen, and to call 911 or go to nearest ER for suicidal ideation, intent or plan.    Return: 3 weeks, CLINIC ONLY (not virtual), for medication management only    Interval History:     Patient did not display signs of nor endorse symptoms of overt psychosis or acute mood disorder requiring hospitalization during the encounter. Patient denied violent thoughts or suicidal or homicidal ideation, intent, or plan.    Pt is pleasant and cooperative on interview. This visit was done in person in the clinic.    Feeling a lot better. Depression is improving. Last drink 3 weeks ago. Taking NALTREXONE and alcohol cravings are better under control. Anxiety is improving. Taking LATUDA 20mg daily. Getting out of the house more. Starting to exercise. Problems  "staying asleep. Anhedonia continues. Going to AA four days per week. Relationship with ex has improved.    Still smoking about a pack per day. New goal is 18 per day.    Improvements on ALL scales.    Reviewed labs    Instruments:     GAD7 Interpretation: 6/21 (1/20/2023); MILD using 5-10-15 cutoffs. Compared to SEVERE 17/21 last time, GAD7 improved.    MDI Interpretation: 19/50 (1/20/2023); NEGATIVE using 21-26-31 cutoffs. Compared to SEVERE DEPRESSION SEVERITY 43/50 last time, MDI improved.    PHQ9 Interpretation: 7/27 (1/20/2023); MILD using 7-15-21 cutoffs, but there tends to be significant variability in sensitivity of cutoff scores between 7 and 15. Compared to SEVERE 27/27 last time, PHQ9 improved.    PSS10 Interpretation: 12/40 (1/20/2023); LOW using 14-27 cutoffs. 0/6 perceived helplessness. 0/4 self-efficacy deficits. Compared to HIGH 27/40 last time, PSS10 improved.    Review of Systems:     Mood and Associated Behaviors: Depression improving. Irritability improving.  Appetite: No concerns.  Anxiety: Anxiety is better.  Stress: Stress is better.  Sleep: overall poor. Problems with staying asleep.    Functioning in Relationships:  Spouse/partner: N/A  Peers: interpersonal deficits noted  Employers:  better    Current Evaluation:     Constitutional  Vitals:     Vitals:    01/20/23 1600   BP: 121/83   Pulse: 80   Weight: 105.9 kg (233 lb 7.5 oz)   Height: 6' 4" (1.93 m)     General:  casual dress, overweight (BMI 25.0 - 29.9)    Psychiatric / Mental Status Examination  1. Appearance: Dress is informal but appropriate. Motor activity normal.  2. Speech: Clear, normal rate and volume. Associations intact. Orderly and without tangentiality.  3. Emotional Expression: Mood is less depressed and less anxious. Affect is congruent with mood.  4. Perception and Thinking: No hallucinations. No suicidality, no homicidality, delusions, or paranoia.  5. Sensorium: Grossly intact. Able to focus for interview.  6. Memory " and Fund of Knowledge: Intact for content of interview.  7. Insight and Judgment: Intact.    Neurological / Musculoskeletal / Osteopathic Structural  Gait, Station:  non-ataxic    Auto-populated Chart Data:     Laboratory Data  Lab Visit on 01/11/2023   Component Date Value Ref Range Status    Sodium 01/11/2023 138  136 - 145 mmol/L Final    Potassium 01/11/2023 4.4  3.5 - 5.1 mmol/L Final    Chloride 01/11/2023 105  95 - 110 mmol/L Final    CO2 01/11/2023 22 (L)  23 - 29 mmol/L Final    Glucose 01/11/2023 89  70 - 110 mg/dL Final    BUN 01/11/2023 15  6 - 20 mg/dL Final    Creatinine 01/11/2023 1.6 (H)  0.5 - 1.4 mg/dL Final    Calcium 01/11/2023 9.5  8.7 - 10.5 mg/dL Final    Total Protein 01/11/2023 6.3  6.0 - 8.4 g/dL Final    Albumin 01/11/2023 3.7  3.5 - 5.2 g/dL Final    Total Bilirubin 01/11/2023 0.6  0.1 - 1.0 mg/dL Final    Alkaline Phosphatase 01/11/2023 84  55 - 135 U/L Final    AST 01/11/2023 35  10 - 40 U/L Final    ALT 01/11/2023 54 (H)  10 - 44 U/L Final    Anion Gap 01/11/2023 11  8 - 16 mmol/L Final    eGFR 01/11/2023 50.9 (A)  >60 mL/min/1.73 m^2 Final    WBC 01/11/2023 7.32  3.90 - 12.70 K/uL Final    RBC 01/11/2023 3.76 (L)  4.60 - 6.20 M/uL Final    Hemoglobin 01/11/2023 13.1 (L)  14.0 - 18.0 g/dL Final    Hematocrit 01/11/2023 36.8 (L)  40.0 - 54.0 % Final    MCV 01/11/2023 98  82 - 98 fL Final    MCH 01/11/2023 34.8 (H)  27.0 - 31.0 pg Final    MCHC 01/11/2023 35.6  32.0 - 36.0 g/dL Final    RDW 01/11/2023 11.9  11.5 - 14.5 % Final    Platelets 01/11/2023 228  150 - 450 K/uL Final    MPV 01/11/2023 9.4  9.2 - 12.9 fL Final    Immature Granulocytes 01/11/2023 0.3  0.0 - 0.5 % Final    Gran # (ANC) 01/11/2023 4.4  1.8 - 7.7 K/uL Final    Immature Grans (Abs) 01/11/2023 0.02  0.00 - 0.04 K/uL Final    Lymph # 01/11/2023 2.1  1.0 - 4.8 K/uL Final    Mono # 01/11/2023 0.6  0.3 - 1.0 K/uL Final    Eos # 01/11/2023 0.1  0.0 - 0.5 K/uL Final    Baso # 01/11/2023 0.05  0.00 - 0.20 K/uL Final    nRBC  2023 0  0 /100 WBC Final    Gran % 2023 60.5  38.0 - 73.0 % Final    Lymph % 2023 29.2  18.0 - 48.0 % Final    Mono % 2023 8.6  4.0 - 15.0 % Final    Eosinophil % 2023 0.7  0.0 - 8.0 % Final    Basophil % 2023 0.7  0.0 - 1.9 % Final    Differential Method 2023 Automated   Final   Lab Visit on 2022   Component Date Value Ref Range Status    Vit D, 1,25-Dihydroxy 2022 152 (H)  20 - 79 pg/mL Final    Vitamin B-12 2022 398  210 - 950 pg/mL Final       Medications  Outpatient Encounter Medications as of 2023   Medication Sig Dispense Refill    hydroCHLOROthiazide (HYDRODIURIL) 25 MG tablet Take 1 tablet (25 mg total) by mouth once daily. 90 tablet 3    losartan (COZAAR) 25 MG tablet Take 1 tablet (25 mg total) by mouth once daily. 90 tablet 3    nebivoloL (BYSTOLIC) 20 mg Tab Take 20 mg by mouth once daily at 6am. 90 tablet 3    sildenafil (REVATIO) 20 mg Tab Take 1-3 tablets (20-60 mg total) by mouth daily as needed (erectile dysfunction). 60 tablet 2    [DISCONTINUED] lurasidone (LATUDA) 40 mg Tab tablet Take 1 tablet (40 mg total) by mouth once daily. 30 tablet 1    [DISCONTINUED] naltrexone (DEPADE) 50 mg tablet Take 50 mg by mouth once daily. 30 tablet 0    [DISCONTINUED] traZODone (DESYREL) 50 MG tablet Take 1 tablet (50 mg total) by mouth every evening. 30 tablet 1    lurasidone (LATUDA) 20 mg Tab tablet Take 1 tablet (20 mg total) by mouth every evening. 30 tablet 1    naltrexone (DEPADE) 50 mg tablet Take 50 mg by mouth once daily. 30 tablet 1    [] nicotine (NICODERM CQ) 14 mg/24 hr Place 1 patch onto the skin once daily. Reapply 14mg patch every day for weeks 7-8. for 14 days 14 patch 0    nicotine (NICODERM CQ) 21 mg/24 hr Place 1 patch onto the skin once daily. Reapply 21mg patch every day for weeks 1-6. (Patient not taking: Reported on 2023) 42 patch 0    [] nicotine (NICODERM CQ) 7 mg/24 hr Place 1 patch onto the skin once  "daily. Reapply 7mg patch every day for weeks 9-10. for 14 days 14 patch 0    traZODone (DESYREL) 100 MG tablet Take 1 tablet (100 mg total) by mouth every evening. 30 tablet 1     No facility-administered encounter medications on file as of 1/20/2023.       Billing Documentation:     Method of Encounter IN PERSON visit at the clinic   Type of Encounter Follow up visit with me   Counseling;  Psychotherapy Not applicable: Not done or UNDER 16 minutes   Counseling;  Tobacco and/or Nicotine 49073: 3 minutes (with payable diagnosis code "F17.210 - Nicotine dependence, cigarettes, uncomplicated ")   Additional Codes and Modifiers 15928: administered and scored more than one psychological or neuropsychological tests (GAD7, MDI, PHQ9, and PSS-10) (16 mins)   Time Remaining Chart/Pt 48486: FOLLOW UP VISIT 49 minutes   Total Mins  (1/20/2023) 068        Ciaran Prather DO  Department of Psychiatry    "

## 2023-01-31 ENCOUNTER — TELEPHONE (OUTPATIENT)
Dept: SMOKING CESSATION | Facility: CLINIC | Age: 53
End: 2023-01-31
Payer: COMMERCIAL

## 2023-01-31 NOTE — TELEPHONE ENCOUNTER
I was able to contact patient with offer to reschedule his missed intake appointment with smoking cessation. He will need to call me back when he has his new schedule to refer to. He is an [employer] employee.

## 2023-02-09 ENCOUNTER — OFFICE VISIT (OUTPATIENT)
Dept: PSYCHIATRY | Facility: CLINIC | Age: 53
End: 2023-02-09
Payer: COMMERCIAL

## 2023-02-09 VITALS
BODY MASS INDEX: 27.7 KG/M2 | DIASTOLIC BLOOD PRESSURE: 89 MMHG | HEIGHT: 76 IN | SYSTOLIC BLOOD PRESSURE: 128 MMHG | WEIGHT: 227.5 LBS | HEART RATE: 79 BPM

## 2023-02-09 DIAGNOSIS — F17.210 CIGARETTE SMOKER: ICD-10-CM

## 2023-02-09 DIAGNOSIS — F41.1 GENERALIZED ANXIETY DISORDER: ICD-10-CM

## 2023-02-09 DIAGNOSIS — F51.05 INSOMNIA DUE TO MENTAL DISORDER: ICD-10-CM

## 2023-02-09 DIAGNOSIS — F31.75 BIPOLAR DISORDER, IN PARTIAL REMISSION, MOST RECENT EPISODE DEPRESSED: Primary | ICD-10-CM

## 2023-02-09 DIAGNOSIS — F10.91 ALCOHOL USE DISORDER IN REMISSION: ICD-10-CM

## 2023-02-09 PROCEDURE — 99214 PR OFFICE/OUTPT VISIT, EST, LEVL IV, 30-39 MIN: ICD-10-PCS | Mod: S$GLB,,, | Performed by: STUDENT IN AN ORGANIZED HEALTH CARE EDUCATION/TRAINING PROGRAM

## 2023-02-09 PROCEDURE — 3079F PR MOST RECENT DIASTOLIC BLOOD PRESSURE 80-89 MM HG: ICD-10-PCS | Mod: CPTII,S$GLB,, | Performed by: STUDENT IN AN ORGANIZED HEALTH CARE EDUCATION/TRAINING PROGRAM

## 2023-02-09 PROCEDURE — 99214 OFFICE O/P EST MOD 30 MIN: CPT | Mod: S$GLB,,, | Performed by: STUDENT IN AN ORGANIZED HEALTH CARE EDUCATION/TRAINING PROGRAM

## 2023-02-09 PROCEDURE — 1159F PR MEDICATION LIST DOCUMENTED IN MEDICAL RECORD: ICD-10-PCS | Mod: CPTII,S$GLB,, | Performed by: STUDENT IN AN ORGANIZED HEALTH CARE EDUCATION/TRAINING PROGRAM

## 2023-02-09 PROCEDURE — 3074F SYST BP LT 130 MM HG: CPT | Mod: CPTII,S$GLB,, | Performed by: STUDENT IN AN ORGANIZED HEALTH CARE EDUCATION/TRAINING PROGRAM

## 2023-02-09 PROCEDURE — 99999 PR PBB SHADOW E&M-EST. PATIENT-LVL III: ICD-10-PCS | Mod: PBBFAC,,, | Performed by: STUDENT IN AN ORGANIZED HEALTH CARE EDUCATION/TRAINING PROGRAM

## 2023-02-09 PROCEDURE — 1160F PR REVIEW ALL MEDS BY PRESCRIBER/CLIN PHARMACIST DOCUMENTED: ICD-10-PCS | Mod: CPTII,S$GLB,, | Performed by: STUDENT IN AN ORGANIZED HEALTH CARE EDUCATION/TRAINING PROGRAM

## 2023-02-09 PROCEDURE — 99999 PR PBB SHADOW E&M-EST. PATIENT-LVL III: CPT | Mod: PBBFAC,,, | Performed by: STUDENT IN AN ORGANIZED HEALTH CARE EDUCATION/TRAINING PROGRAM

## 2023-02-09 PROCEDURE — 3008F BODY MASS INDEX DOCD: CPT | Mod: CPTII,S$GLB,, | Performed by: STUDENT IN AN ORGANIZED HEALTH CARE EDUCATION/TRAINING PROGRAM

## 2023-02-09 PROCEDURE — 1159F MED LIST DOCD IN RCRD: CPT | Mod: CPTII,S$GLB,, | Performed by: STUDENT IN AN ORGANIZED HEALTH CARE EDUCATION/TRAINING PROGRAM

## 2023-02-09 PROCEDURE — 1160F RVW MEDS BY RX/DR IN RCRD: CPT | Mod: CPTII,S$GLB,, | Performed by: STUDENT IN AN ORGANIZED HEALTH CARE EDUCATION/TRAINING PROGRAM

## 2023-02-09 PROCEDURE — 3079F DIAST BP 80-89 MM HG: CPT | Mod: CPTII,S$GLB,, | Performed by: STUDENT IN AN ORGANIZED HEALTH CARE EDUCATION/TRAINING PROGRAM

## 2023-02-09 PROCEDURE — 3074F PR MOST RECENT SYSTOLIC BLOOD PRESSURE < 130 MM HG: ICD-10-PCS | Mod: CPTII,S$GLB,, | Performed by: STUDENT IN AN ORGANIZED HEALTH CARE EDUCATION/TRAINING PROGRAM

## 2023-02-09 PROCEDURE — 3008F PR BODY MASS INDEX (BMI) DOCUMENTED: ICD-10-PCS | Mod: CPTII,S$GLB,, | Performed by: STUDENT IN AN ORGANIZED HEALTH CARE EDUCATION/TRAINING PROGRAM

## 2023-02-09 RX ORDER — TRAZODONE HYDROCHLORIDE 150 MG/1
150 TABLET ORAL NIGHTLY
Qty: 30 TABLET | Refills: 1 | Status: SHIPPED | OUTPATIENT
Start: 2023-02-09 | End: 2023-04-13 | Stop reason: SDUPTHER

## 2023-02-09 NOTE — PROGRESS NOTES
Outpatient Psychiatry Followup Visit (DO/MD/NP)    2/9/2023    Georgette Reyes, a 52 y.o. male, presenting for followup visit.     Reason for Encounter: Medication management. Met with patient, in person.     Assessment - Diagnosis - Goals:     Diagnostic Impression     ICD-10-CM ICD-9-CM   1. Bipolar disorder, in partial remission, most recent episode depressed  F31.75 296.55   2. Generalized anxiety disorder  F41.1 300.02   3. Insomnia due to mental disorder  F51.05 300.9     327.02   4. Alcohol use disorder in remission  F10.91 305.03   5. Cigarette smoker  F17.210 305.1   6. BMI 27.0-27.9,adult  Z68.27 V85.23      Progress See most recent case formulation. Forward progress.     Medication Management   Chart was reviewed. The risks and benefits of medication were discussed with pt. The treatment plan and followup plan were reviewed with pt. Pt understands to contact clinic if symptoms worsen. Pt understand to call 911 or go to nearest ER for suicidal ideation, intent or plan.   RX History Psychotropic history includes  AMBIEN, DOXEPIN, LAMICTAL, LATUDA, LIBRIUM, RESTORIL, TRAZODONE, WELLBUTRIN, and ZOLOFT .   Current RX Pt's use of a BETA-BLOCKER is potentially worsening depression  Considering mood stabilizers TRILEPTAL, LAMICTAL, LITHIUM  Increase TRAZODONE  Chosen for insomnia and depression  Titration:  09FEB2023: Increase to 150mg HS  20JAN2023: Increase to 100mg HS  [id number]: Start 50mg HS  Prior to evaluation pt had been taking LATUDA and DOXEPIN  Continue LATUDA  Metabolic monitoring prior to APR2023  Medication adjustments:  20JAN2023: Reduce to 20mg daily  06JAN2023: Reduce to 40mg daily  [id number]: Increase to 80mg daily  Prior to evaluation pt had been taking 60mg daily  Continue NALTREXONE  CMP ordered 06JAN2023; repeat next visit  Medication Adjustments:  06JAN2023: Start 50mg daily  [id number]: Start 50mg daily (noninitiation)  Discontinue NRT (10 wk TD patch taper: 21mg/d for 6w, 14mg/d for 2w, 7mg/d  for 2w) (nonpersistence)   Education & Counseling RX administration and adherence   Other Orders PENDING from past encounter   Monitor GAD7, PHQ9, PSS4  Monitor alcohol use - last drink [id number]  Monitor sleep - Will consider starting a sleep diary at a future encounter.  Monitor vitals, especially weight and BP   Return: 1 month, CLINIC ONLY (not virtual), for medication management only     Interval History:     Patient did not display signs of nor endorse symptoms of overt psychosis or acute mood disorder requiring hospitalization during the encounter. Patient denied violent thoughts or suicidal or homicidal ideation, intent, or plan.    Pt is pleasant and cooperative on interview. Pt is a good historian. This visit was done in person in the clinic.    Taking meds as ordered. Reports things are much better. Has no complaints other than interrupted sleep. Discussed increasing TRAZODONE once more to 150mg nightly.    Scales are better.    Eating better and losing weight. Last drink [id number].     Instruments:     GAD7 Interpretation: 2/21 (2/9/2023); NEGATIVE using 5-10-15 cutoffs. Compared to MILD 6/21 last time, GAD7 improved.    MDI Interpretation: 1/50 (2/9/2023); NEGATIVE using 21-26-31 cutoffs. Compared to NEGATIVE 19/50 last time, MDI is stable. Will discontinue MDI.    PHQ9 Interpretation: 1/27 (2/9/2023); NEGATIVE using 7-15-21 cutoffs. Compared to MILD 7/27 last time, PHQ9 improved.    PSS4 Interpretation: 0/16(2/9/2023); LOW using 6-11 cutoffs. 0/2 PH, 0/2 LSE. This is a new baseline reading. Stratification of stress severity was done with PSS10 last time; compared to LOW 12/40 last time, stress is stable.     Review of Systems:     Mood and Associated Behaviors: Anhedonia improving. Depression improving.  Appetite: No concerns.  Anxiety: Anxiety is better.  Stress: Stress is stable.  Sleep: Sleep onset is usually within 30 minutes. Duration is 6-8 hours with countless interruptions. Pt denies naps.  "Nightmares have not been a problem.    Functioning in Relationships:  Spouse/partner: no concerns identified  Peers: interpersonal deficits noted  Employers: no concerns identified    Current Evaluation:     Constitutional  Vitals:     Vitals:    02/09/23 1551   BP: 128/89   Pulse: 79   Weight: 103.2 kg (227 lb 8.2 oz)   Height: 6' 4" (1.93 m)     General:  casual dress, overweight (BMI 25.0 - 29.9)    Psychiatric / Mental Status Examination  1. Appearance: Dress is informal but appropriate. Motor activity normal.  2. Discourse: Clear speech with normal rate and volume. Associations intact. Orderly and without tangentiality.  3. Emotional Expression: Euthymic mood with appropriate affect.  4. Perception and Thinking: No hallucinations. No suicidality, no homicidality, delusions, or paranoia.  5. Sensorium: Grossly intact. Able to focus for interview.  6. Memory and Fund of Knowledge: Intact for content of interview.  7. Insight and Judgment: Intact.    Neurological / Musculoskeletal / Osteopathic Structural  Gait, Station:  non-ataxic    Auto-populated chart data omitted from this note for brevity.    Billing Documentation:     Method of Encounter IN PERSON visit at the clinic   Type of Encounter Follow up visit with me   Counseling;  Psychotherapy Not applicable: Not done or UNDER 16 minutes   Counseling;  Tobacco and/or Nicotine Not applicable: Not done or UNDER 3 minutes   Additional Codes and Modifiers None   Time Remaining Chart/Pt 41935: FOLLOW UP VISIT 30 minutes   Total Mins  (2/9/2023) 030        Ciaran Prather DO  Department of Psychiatry    "

## 2023-04-11 ENCOUNTER — PATIENT MESSAGE (OUTPATIENT)
Dept: ADMINISTRATIVE | Facility: HOSPITAL | Age: 53
End: 2023-04-11

## 2023-04-13 ENCOUNTER — OFFICE VISIT (OUTPATIENT)
Dept: FAMILY MEDICINE | Facility: CLINIC | Age: 53
End: 2023-04-13
Payer: COMMERCIAL

## 2023-04-13 VITALS
HEIGHT: 76 IN | BODY MASS INDEX: 29.06 KG/M2 | OXYGEN SATURATION: 99 % | TEMPERATURE: 98 F | SYSTOLIC BLOOD PRESSURE: 126 MMHG | HEART RATE: 89 BPM | WEIGHT: 238.63 LBS | RESPIRATION RATE: 16 BRPM | DIASTOLIC BLOOD PRESSURE: 80 MMHG

## 2023-04-13 DIAGNOSIS — F31.31 BIPOLAR AFFECTIVE DISORDER, CURRENTLY DEPRESSED, MILD: ICD-10-CM

## 2023-04-13 DIAGNOSIS — G47.00 INSOMNIA, UNSPECIFIED TYPE: ICD-10-CM

## 2023-04-13 DIAGNOSIS — R68.82 LOW LIBIDO: Primary | ICD-10-CM

## 2023-04-13 PROCEDURE — 99999 PR PBB SHADOW E&M-EST. PATIENT-LVL III: ICD-10-PCS | Mod: PBBFAC,,, | Performed by: FAMILY MEDICINE

## 2023-04-13 PROCEDURE — 4010F ACE/ARB THERAPY RXD/TAKEN: CPT | Mod: S$GLB,,, | Performed by: FAMILY MEDICINE

## 2023-04-13 PROCEDURE — 3079F PR MOST RECENT DIASTOLIC BLOOD PRESSURE 80-89 MM HG: ICD-10-PCS | Mod: S$GLB,,, | Performed by: FAMILY MEDICINE

## 2023-04-13 PROCEDURE — 3008F PR BODY MASS INDEX (BMI) DOCUMENTED: ICD-10-PCS | Mod: S$GLB,,, | Performed by: FAMILY MEDICINE

## 2023-04-13 PROCEDURE — 99213 OFFICE O/P EST LOW 20 MIN: CPT | Mod: S$GLB,,, | Performed by: FAMILY MEDICINE

## 2023-04-13 PROCEDURE — 3074F SYST BP LT 130 MM HG: CPT | Mod: S$GLB,,, | Performed by: FAMILY MEDICINE

## 2023-04-13 PROCEDURE — 3074F PR MOST RECENT SYSTOLIC BLOOD PRESSURE < 130 MM HG: ICD-10-PCS | Mod: S$GLB,,, | Performed by: FAMILY MEDICINE

## 2023-04-13 PROCEDURE — 1160F PR REVIEW ALL MEDS BY PRESCRIBER/CLIN PHARMACIST DOCUMENTED: ICD-10-PCS | Mod: S$GLB,,, | Performed by: FAMILY MEDICINE

## 2023-04-13 PROCEDURE — 99213 PR OFFICE/OUTPT VISIT, EST, LEVL III, 20-29 MIN: ICD-10-PCS | Mod: S$GLB,,, | Performed by: FAMILY MEDICINE

## 2023-04-13 PROCEDURE — 3008F BODY MASS INDEX DOCD: CPT | Mod: S$GLB,,, | Performed by: FAMILY MEDICINE

## 2023-04-13 PROCEDURE — 4010F PR ACE/ARB THEARPY RXD/TAKEN: ICD-10-PCS | Mod: S$GLB,,, | Performed by: FAMILY MEDICINE

## 2023-04-13 PROCEDURE — 1159F MED LIST DOCD IN RCRD: CPT | Mod: S$GLB,,, | Performed by: FAMILY MEDICINE

## 2023-04-13 PROCEDURE — 1160F RVW MEDS BY RX/DR IN RCRD: CPT | Mod: S$GLB,,, | Performed by: FAMILY MEDICINE

## 2023-04-13 PROCEDURE — 3079F DIAST BP 80-89 MM HG: CPT | Mod: S$GLB,,, | Performed by: FAMILY MEDICINE

## 2023-04-13 PROCEDURE — 99999 PR PBB SHADOW E&M-EST. PATIENT-LVL III: CPT | Mod: PBBFAC,,, | Performed by: FAMILY MEDICINE

## 2023-04-13 PROCEDURE — 1159F PR MEDICATION LIST DOCUMENTED IN MEDICAL RECORD: ICD-10-PCS | Mod: S$GLB,,, | Performed by: FAMILY MEDICINE

## 2023-04-13 RX ORDER — LURASIDONE HYDROCHLORIDE 20 MG/1
20 TABLET, FILM COATED ORAL NIGHTLY
Qty: 90 TABLET | Refills: 3 | Status: SHIPPED | OUTPATIENT
Start: 2023-04-13 | End: 2023-05-29 | Stop reason: SDUPTHER

## 2023-04-13 RX ORDER — TRAZODONE HYDROCHLORIDE 150 MG/1
150 TABLET ORAL NIGHTLY
Qty: 90 TABLET | Refills: 3 | Status: ON HOLD | OUTPATIENT
Start: 2023-04-13 | End: 2023-05-28 | Stop reason: HOSPADM

## 2023-04-13 NOTE — PROGRESS NOTES
"[facility] - Clinic Note    Subjective      Mr. Reyes is a 52 y.o. male who presents to clinic for Follow-up (6mth follow up/refills)    Stable through Psychiatry.  Medications have been working well.  Has been having low libido.    PMH Georgette has a past medical history of Bipolar disorder, History of alcohol abuse, and Hypertension.   PSXH Georgette has a past surgical history that includes Cholecystectomy; Back surgery; Anterior cruciate ligament repair; and gastroplasty, sleeve, endoscopic.    Georgette's family history includes Hypertension in his father and mother.   SH Georgette reports that he has been smoking cigarettes. He has been smoking an average of 1 pack per day. He has never used smokeless tobacco. He reports that he does not currently use alcohol. He reports that he does not use drugs.   ALG Georgette has No Known Allergies.   MED Georgette has a current medication list which includes the following prescription(s): hydrochlorothiazide, losartan, nebivolol, sildenafil, lurasidone, and trazodone.     Review of Systems   Constitutional:  Negative for chills and fever.   Respiratory:  Negative for shortness of breath.    Cardiovascular:  Negative for chest pain.   Objective     /80 (BP Location: Left arm, Patient Position: Sitting, BP Method: Large (Manual))   Pulse 89   Temp 97.9 °F (36.6 °C) (Temporal)   Resp 16   Ht 6' 4" (1.93 m)   Wt 108.2 kg (238 lb 9.6 oz)   SpO2 99%   BMI 29.04 kg/m²     Physical Exam  Vitals and nursing note reviewed.   Constitutional:       General: He is not in acute distress.     Appearance: Normal appearance. He is well-developed. He is not diaphoretic.   HENT:      Head: Normocephalic and atraumatic.      Right Ear: External ear normal.      Left Ear: External ear normal.   Eyes:      General:         Right eye: No discharge.         Left eye: No discharge.   Cardiovascular:      Rate and Rhythm: Normal rate and regular rhythm.      Heart sounds: Normal heart sounds. "   Pulmonary:      Effort: Pulmonary effort is normal.      Breath sounds: Normal breath sounds. No wheezing or rales.   Skin:     General: Skin is warm and dry.   Neurological:      Mental Status: He is alert and oriented to person, place, and time. Mental status is at baseline.   Psychiatric:         Mood and Affect: Mood normal.         Behavior: Behavior normal.         Thought Content: Thought content normal.         Judgment: Judgment normal.      Assessment/Plan     1. Low libido  Testosterone Panel    CBC Auto Differential    Comprehensive Metabolic Panel      2. Bipolar affective disorder, currently depressed, mild  lurasidone (LATUDA) 20 mg Tab tablet      3. Insomnia, unspecified type  traZODone (DESYREL) 150 MG tablet        Continue current medications as prescribed.  Will check labs as above given low libido.    Future Appointments   Date Time Provider Department Center   10/17/2023  3:40 PM Raymundo Stubbs MD [facility]         Raymundo Stubbs MD  Family Medicine  [facility]

## 2023-05-08 ENCOUNTER — LAB VISIT (OUTPATIENT)
Dept: LAB | Facility: HOSPITAL | Age: 53
End: 2023-05-08
Attending: FAMILY MEDICINE
Payer: COMMERCIAL

## 2023-05-08 DIAGNOSIS — F10.91 ALCOHOL USE DISORDER IN REMISSION: Primary | ICD-10-CM

## 2023-05-08 DIAGNOSIS — R68.82 LOW LIBIDO: ICD-10-CM

## 2023-05-08 LAB
ALBUMIN SERPL BCP-MCNC: 4.1 G/DL (ref 3.5–5.2)
ALP SERPL-CCNC: 77 U/L (ref 55–135)
ALT SERPL W/O P-5'-P-CCNC: 15 U/L (ref 10–44)
ANION GAP SERPL CALC-SCNC: 14 MMOL/L (ref 8–16)
AST SERPL-CCNC: 19 U/L (ref 10–40)
BASOPHILS # BLD AUTO: 0.02 K/UL (ref 0–0.2)
BASOPHILS NFR BLD: 0.3 % (ref 0–1.9)
BILIRUB SERPL-MCNC: 1.2 MG/DL (ref 0.1–1)
BUN SERPL-MCNC: 11 MG/DL (ref 6–20)
CALCIUM SERPL-MCNC: 9.4 MG/DL (ref 8.7–10.5)
CHLORIDE SERPL-SCNC: 103 MMOL/L (ref 95–110)
CO2 SERPL-SCNC: 22 MMOL/L (ref 23–29)
CREAT SERPL-MCNC: 0.9 MG/DL (ref 0.5–1.4)
DIFFERENTIAL METHOD: ABNORMAL
EOSINOPHIL # BLD AUTO: 0 K/UL (ref 0–0.5)
EOSINOPHIL NFR BLD: 0.3 % (ref 0–8)
ERYTHROCYTE [DISTWIDTH] IN BLOOD BY AUTOMATED COUNT: 13.5 % (ref 11.5–14.5)
EST. GFR  (NO RACE VARIABLE): >60 ML/MIN/1.73 M^2
GLUCOSE SERPL-MCNC: 111 MG/DL (ref 70–110)
HCT VFR BLD AUTO: 45.2 % (ref 40–54)
HGB BLD-MCNC: 16.2 G/DL (ref 14–18)
IMM GRANULOCYTES # BLD AUTO: 0.04 K/UL (ref 0–0.04)
IMM GRANULOCYTES NFR BLD AUTO: 0.6 % (ref 0–0.5)
LYMPHOCYTES # BLD AUTO: 1.6 K/UL (ref 1–4.8)
LYMPHOCYTES NFR BLD: 25.2 % (ref 18–48)
MCH RBC QN AUTO: 33.3 PG (ref 27–31)
MCHC RBC AUTO-ENTMCNC: 35.8 G/DL (ref 32–36)
MCV RBC AUTO: 93 FL (ref 82–98)
MONOCYTES # BLD AUTO: 0.6 K/UL (ref 0.3–1)
MONOCYTES NFR BLD: 10.1 % (ref 4–15)
NEUTROPHILS # BLD AUTO: 4 K/UL (ref 1.8–7.7)
NEUTROPHILS NFR BLD: 63.5 % (ref 38–73)
NRBC BLD-RTO: 0 /100 WBC
PLATELET # BLD AUTO: 167 K/UL (ref 150–450)
PMV BLD AUTO: 9.5 FL (ref 9.2–12.9)
POTASSIUM SERPL-SCNC: 3.5 MMOL/L (ref 3.5–5.1)
PROT SERPL-MCNC: 6.7 G/DL (ref 6–8.4)
RBC # BLD AUTO: 4.86 M/UL (ref 4.6–6.2)
SODIUM SERPL-SCNC: 139 MMOL/L (ref 136–145)
WBC # BLD AUTO: 6.31 K/UL (ref 3.9–12.7)

## 2023-05-08 PROCEDURE — 85025 COMPLETE CBC W/AUTO DIFF WBC: CPT | Performed by: FAMILY MEDICINE

## 2023-05-08 PROCEDURE — 36415 COLL VENOUS BLD VENIPUNCTURE: CPT | Performed by: FAMILY MEDICINE

## 2023-05-08 PROCEDURE — 84270 ASSAY OF SEX HORMONE GLOBUL: CPT | Performed by: FAMILY MEDICINE

## 2023-05-08 PROCEDURE — 84403 ASSAY OF TOTAL TESTOSTERONE: CPT | Performed by: FAMILY MEDICINE

## 2023-05-08 PROCEDURE — 80053 COMPREHEN METABOLIC PANEL: CPT | Performed by: FAMILY MEDICINE

## 2023-05-08 RX ORDER — CHLORDIAZEPOXIDE HYDROCHLORIDE 25 MG/1
CAPSULE, GELATIN COATED ORAL
Qty: 48 CAPSULE | Refills: 0 | Status: ON HOLD | OUTPATIENT
Start: 2023-05-08 | End: 2023-05-28 | Stop reason: HOSPADM

## 2023-05-12 ENCOUNTER — PATIENT MESSAGE (OUTPATIENT)
Dept: FAMILY MEDICINE | Facility: CLINIC | Age: 53
End: 2023-05-12

## 2023-05-15 LAB
ALBUMIN SERPL-MCNC: 4.4 G/DL (ref 3.6–5.1)
SHBG SERPL-SCNC: 45 NMOL/L (ref 10–50)
TESTOST FREE SERPL-MCNC: 50.7 PG/ML (ref 46–224)
TESTOST SERPL-MCNC: 490 NG/DL (ref 250–1100)
TESTOSTERONE.FREE+WB SERPL-MCNC: 102 NG/DL (ref 110–575)

## 2023-05-27 ENCOUNTER — PATIENT MESSAGE (OUTPATIENT)
Dept: FAMILY MEDICINE | Facility: CLINIC | Age: 53
End: 2023-05-27

## 2023-05-27 ENCOUNTER — HOSPITAL ENCOUNTER (OUTPATIENT)
Facility: HOSPITAL | Age: 53
Discharge: HOME | End: 2023-05-28
Attending: EMERGENCY MEDICINE | Admitting: STUDENT IN AN ORGANIZED HEALTH CARE EDUCATION/TRAINING PROGRAM
Payer: COMMERCIAL

## 2023-05-27 DIAGNOSIS — F10.10 ALCOHOL ABUSE: Primary | ICD-10-CM

## 2023-05-27 DIAGNOSIS — R07.9 CHEST PAIN: ICD-10-CM

## 2023-05-27 DIAGNOSIS — I10 ESSENTIAL HYPERTENSION: ICD-10-CM

## 2023-05-27 DIAGNOSIS — F10.930 ALCOHOL WITHDRAWAL SYNDROME WITHOUT COMPLICATION: ICD-10-CM

## 2023-05-27 DIAGNOSIS — F31.31 BIPOLAR AFFECTIVE DISORDER, CURRENTLY DEPRESSED, MILD: ICD-10-CM

## 2023-05-27 PROBLEM — E87.29 HIGH ANION GAP METABOLIC ACIDOSIS: Status: ACTIVE | Noted: 2023-05-27

## 2023-05-27 PROBLEM — F10.939 ALCOHOL WITHDRAWAL SYNDROME WITH COMPLICATION: Status: ACTIVE | Noted: 2023-05-27

## 2023-05-27 PROBLEM — R11.2 NAUSEA AND VOMITING: Status: ACTIVE | Noted: 2023-05-27

## 2023-05-27 LAB
ALBUMIN SERPL BCP-MCNC: 4.2 G/DL (ref 3.5–5.2)
ALP SERPL-CCNC: 97 U/L (ref 55–135)
ALT SERPL W/O P-5'-P-CCNC: 18 U/L (ref 10–44)
AMPHET+METHAMPHET UR QL: NEGATIVE
ANION GAP SERPL CALC-SCNC: 18 MMOL/L (ref 8–16)
AST SERPL-CCNC: 31 U/L (ref 10–40)
BARBITURATES UR QL SCN>200 NG/ML: NEGATIVE
BASOPHILS # BLD AUTO: 0.03 K/UL (ref 0–0.2)
BASOPHILS NFR BLD: 0.5 % (ref 0–1.9)
BENZODIAZ UR QL SCN>200 NG/ML: ABNORMAL
BILIRUB SERPL-MCNC: 0.9 MG/DL (ref 0.1–1)
BILIRUB UR QL STRIP: NEGATIVE
BUN SERPL-MCNC: 9 MG/DL (ref 6–20)
BZE UR QL SCN: NEGATIVE
CALCIUM SERPL-MCNC: 9.6 MG/DL (ref 8.7–10.5)
CANNABINOIDS UR QL SCN: ABNORMAL
CHLORIDE SERPL-SCNC: 104 MMOL/L (ref 95–110)
CLARITY UR: CLEAR
CO2 SERPL-SCNC: 19 MMOL/L (ref 23–29)
COLOR UR: YELLOW
CREAT SERPL-MCNC: 1 MG/DL (ref 0.5–1.4)
CREAT UR-MCNC: 124.8 MG/DL (ref 23–375)
DIFFERENTIAL METHOD: ABNORMAL
EOSINOPHIL # BLD AUTO: 0 K/UL (ref 0–0.5)
EOSINOPHIL NFR BLD: 0.7 % (ref 0–8)
ERYTHROCYTE [DISTWIDTH] IN BLOOD BY AUTOMATED COUNT: 13.5 % (ref 11.5–14.5)
EST. GFR  (NO RACE VARIABLE): >60 ML/MIN/1.73 M^2
ETHANOL SERPL-MCNC: 169 MG/DL (ref 0–10)
GLUCOSE SERPL-MCNC: 104 MG/DL (ref 70–110)
GLUCOSE UR QL STRIP: NEGATIVE
HCT VFR BLD AUTO: 44.3 % (ref 40–54)
HGB BLD-MCNC: 16.1 G/DL (ref 14–18)
HGB UR QL STRIP: NEGATIVE
IMM GRANULOCYTES # BLD AUTO: 0.03 K/UL (ref 0–0.04)
IMM GRANULOCYTES NFR BLD AUTO: 0.5 % (ref 0–0.5)
KETONES UR QL STRIP: ABNORMAL
LEUKOCYTE ESTERASE UR QL STRIP: NEGATIVE
LIPASE SERPL-CCNC: 50 U/L (ref 4–60)
LYMPHOCYTES # BLD AUTO: 2.3 K/UL (ref 1–4.8)
LYMPHOCYTES NFR BLD: 39.5 % (ref 18–48)
MCH RBC QN AUTO: 33.5 PG (ref 27–31)
MCHC RBC AUTO-ENTMCNC: 36.3 G/DL (ref 32–36)
MCV RBC AUTO: 92 FL (ref 82–98)
METHADONE UR QL SCN>300 NG/ML: NEGATIVE
MONOCYTES # BLD AUTO: 0.4 K/UL (ref 0.3–1)
MONOCYTES NFR BLD: 6.4 % (ref 4–15)
NEUTROPHILS # BLD AUTO: 3.1 K/UL (ref 1.8–7.7)
NEUTROPHILS NFR BLD: 52.4 % (ref 38–73)
NITRITE UR QL STRIP: NEGATIVE
NRBC BLD-RTO: 0 /100 WBC
OPIATES UR QL SCN: NEGATIVE
PCP UR QL SCN>25 NG/ML: NEGATIVE
PH UR STRIP: 6 [PH] (ref 5–8)
PLATELET # BLD AUTO: 170 K/UL (ref 150–450)
PMV BLD AUTO: 9.1 FL (ref 9.2–12.9)
POTASSIUM SERPL-SCNC: 3.8 MMOL/L (ref 3.5–5.1)
PROT SERPL-MCNC: 7.2 G/DL (ref 6–8.4)
PROT UR QL STRIP: ABNORMAL
RBC # BLD AUTO: 4.81 M/UL (ref 4.6–6.2)
SODIUM SERPL-SCNC: 141 MMOL/L (ref 136–145)
SP GR UR STRIP: 1.01 (ref 1–1.03)
TOXICOLOGY INFORMATION: ABNORMAL
URN SPEC COLLECT METH UR: ABNORMAL
UROBILINOGEN UR STRIP-ACNC: NEGATIVE EU/DL
WBC # BLD AUTO: 5.82 K/UL (ref 3.9–12.7)

## 2023-05-27 PROCEDURE — G0378 HOSPITAL OBSERVATION PER HR: HCPCS

## 2023-05-27 PROCEDURE — 80307 DRUG TEST PRSMV CHEM ANLYZR: CPT | Performed by: EMERGENCY MEDICINE

## 2023-05-27 PROCEDURE — 99285 EMERGENCY DEPT VISIT HI MDM: CPT | Mod: 25

## 2023-05-27 PROCEDURE — 25000003 PHARM REV CODE 250: Performed by: EMERGENCY MEDICINE

## 2023-05-27 PROCEDURE — 85025 COMPLETE CBC W/AUTO DIFF WBC: CPT | Performed by: EMERGENCY MEDICINE

## 2023-05-27 PROCEDURE — 87389 HIV-1 AG W/HIV-1&-2 AB AG IA: CPT | Performed by: EMERGENCY MEDICINE

## 2023-05-27 PROCEDURE — S4991 NICOTINE PATCH NONLEGEND: HCPCS | Performed by: STUDENT IN AN ORGANIZED HEALTH CARE EDUCATION/TRAINING PROGRAM

## 2023-05-27 PROCEDURE — 96376 TX/PRO/DX INJ SAME DRUG ADON: CPT

## 2023-05-27 PROCEDURE — 99223 PR INITIAL HOSPITAL CARE,LEVL III: ICD-10-PCS | Mod: ,,, | Performed by: STUDENT IN AN ORGANIZED HEALTH CARE EDUCATION/TRAINING PROGRAM

## 2023-05-27 PROCEDURE — 81003 URINALYSIS AUTO W/O SCOPE: CPT | Mod: 59 | Performed by: EMERGENCY MEDICINE

## 2023-05-27 PROCEDURE — 96375 TX/PRO/DX INJ NEW DRUG ADDON: CPT

## 2023-05-27 PROCEDURE — 96361 HYDRATE IV INFUSION ADD-ON: CPT

## 2023-05-27 PROCEDURE — 63600175 PHARM REV CODE 636 W HCPCS: Performed by: EMERGENCY MEDICINE

## 2023-05-27 PROCEDURE — 80053 COMPREHEN METABOLIC PANEL: CPT | Performed by: EMERGENCY MEDICINE

## 2023-05-27 PROCEDURE — 99223 1ST HOSP IP/OBS HIGH 75: CPT | Mod: ,,, | Performed by: STUDENT IN AN ORGANIZED HEALTH CARE EDUCATION/TRAINING PROGRAM

## 2023-05-27 PROCEDURE — 63600175 PHARM REV CODE 636 W HCPCS: Performed by: STUDENT IN AN ORGANIZED HEALTH CARE EDUCATION/TRAINING PROGRAM

## 2023-05-27 PROCEDURE — 86803 HEPATITIS C AB TEST: CPT | Performed by: EMERGENCY MEDICINE

## 2023-05-27 PROCEDURE — 82077 ASSAY SPEC XCP UR&BREATH IA: CPT | Performed by: EMERGENCY MEDICINE

## 2023-05-27 PROCEDURE — 96365 THER/PROPH/DIAG IV INF INIT: CPT

## 2023-05-27 PROCEDURE — 25000003 PHARM REV CODE 250: Performed by: STUDENT IN AN ORGANIZED HEALTH CARE EDUCATION/TRAINING PROGRAM

## 2023-05-27 PROCEDURE — 83690 ASSAY OF LIPASE: CPT | Performed by: EMERGENCY MEDICINE

## 2023-05-27 RX ORDER — LOSARTAN POTASSIUM 25 MG/1
25 TABLET ORAL DAILY
Status: DISCONTINUED | OUTPATIENT
Start: 2023-05-28 | End: 2023-05-27

## 2023-05-27 RX ORDER — HYDROCHLOROTHIAZIDE 12.5 MG/1
25 TABLET ORAL DAILY
Status: DISCONTINUED | OUTPATIENT
Start: 2023-05-28 | End: 2023-05-27

## 2023-05-27 RX ORDER — FOLIC ACID 1 MG/1
1 TABLET ORAL DAILY
Status: DISCONTINUED | OUTPATIENT
Start: 2023-05-27 | End: 2023-05-28 | Stop reason: HOSPADM

## 2023-05-27 RX ORDER — LURASIDONE HYDROCHLORIDE 20 MG/1
20 TABLET, FILM COATED ORAL NIGHTLY
Status: DISCONTINUED | OUTPATIENT
Start: 2023-05-27 | End: 2023-05-27

## 2023-05-27 RX ORDER — ONDANSETRON 2 MG/ML
4 INJECTION INTRAMUSCULAR; INTRAVENOUS
Status: COMPLETED | OUTPATIENT
Start: 2023-05-27 | End: 2023-05-27

## 2023-05-27 RX ORDER — SODIUM CHLORIDE 0.9 % (FLUSH) 0.9 %
10 SYRINGE (ML) INJECTION EVERY 12 HOURS PRN
Status: DISCONTINUED | OUTPATIENT
Start: 2023-05-27 | End: 2023-05-28 | Stop reason: HOSPADM

## 2023-05-27 RX ORDER — LORAZEPAM 2 MG/ML
2 INJECTION INTRAMUSCULAR EVERY 4 HOURS PRN
Status: DISCONTINUED | OUTPATIENT
Start: 2023-05-27 | End: 2023-05-28 | Stop reason: HOSPADM

## 2023-05-27 RX ORDER — NALOXONE HCL 0.4 MG/ML
0.02 VIAL (ML) INJECTION
Status: DISCONTINUED | OUTPATIENT
Start: 2023-05-27 | End: 2023-05-28 | Stop reason: HOSPADM

## 2023-05-27 RX ORDER — DIAZEPAM 5 MG/1
5 TABLET ORAL 2 TIMES DAILY
Status: DISCONTINUED | OUTPATIENT
Start: 2023-05-27 | End: 2023-05-28

## 2023-05-27 RX ORDER — TRAZODONE HYDROCHLORIDE 50 MG/1
150 TABLET ORAL NIGHTLY
Status: DISCONTINUED | OUTPATIENT
Start: 2023-05-27 | End: 2023-05-28 | Stop reason: HOSPADM

## 2023-05-27 RX ORDER — ONDANSETRON 2 MG/ML
4 INJECTION INTRAMUSCULAR; INTRAVENOUS EVERY 8 HOURS PRN
Status: DISCONTINUED | OUTPATIENT
Start: 2023-05-27 | End: 2023-05-28 | Stop reason: HOSPADM

## 2023-05-27 RX ORDER — FOLIC ACID 1 MG/1
1 TABLET ORAL DAILY
Status: DISCONTINUED | OUTPATIENT
Start: 2023-05-28 | End: 2023-05-27

## 2023-05-27 RX ORDER — LORAZEPAM 2 MG/ML
1 INJECTION INTRAMUSCULAR
Status: COMPLETED | OUTPATIENT
Start: 2023-05-27 | End: 2023-05-27

## 2023-05-27 RX ORDER — THIAMINE HCL 100 MG
100 TABLET ORAL DAILY
Status: DISCONTINUED | OUTPATIENT
Start: 2023-05-28 | End: 2023-05-28 | Stop reason: HOSPADM

## 2023-05-27 RX ORDER — LOSARTAN POTASSIUM 25 MG/1
25 TABLET ORAL NIGHTLY
Status: DISCONTINUED | OUTPATIENT
Start: 2023-05-27 | End: 2023-05-28 | Stop reason: HOSPADM

## 2023-05-27 RX ORDER — METOPROLOL TARTRATE 25 MG/1
100 TABLET, FILM COATED ORAL 2 TIMES DAILY
Status: DISCONTINUED | OUTPATIENT
Start: 2023-05-27 | End: 2023-05-28 | Stop reason: HOSPADM

## 2023-05-27 RX ORDER — IBUPROFEN 200 MG
1 TABLET ORAL DAILY PRN
Status: DISCONTINUED | OUTPATIENT
Start: 2023-05-27 | End: 2023-05-27

## 2023-05-27 RX ORDER — IBUPROFEN 200 MG
1 TABLET ORAL DAILY PRN
Status: DISCONTINUED | OUTPATIENT
Start: 2023-05-27 | End: 2023-05-28 | Stop reason: HOSPADM

## 2023-05-27 RX ADMIN — FOLIC ACID 1 MG: 1 TABLET ORAL at 03:05

## 2023-05-27 RX ADMIN — Medication 1 PATCH: at 06:05

## 2023-05-27 RX ADMIN — DIAZEPAM 5 MG: 5 TABLET ORAL at 06:05

## 2023-05-27 RX ADMIN — LOSARTAN POTASSIUM 25 MG: 25 TABLET, FILM COATED ORAL at 08:05

## 2023-05-27 RX ADMIN — LORAZEPAM 2 MG: 2 INJECTION INTRAMUSCULAR; INTRAVENOUS at 08:05

## 2023-05-27 RX ADMIN — ONDANSETRON 4 MG: 2 INJECTION INTRAMUSCULAR; INTRAVENOUS at 03:05

## 2023-05-27 RX ADMIN — LORAZEPAM 1 MG: 2 INJECTION INTRAMUSCULAR; INTRAVENOUS at 03:05

## 2023-05-27 RX ADMIN — TRAZODONE HYDROCHLORIDE 150 MG: 50 TABLET ORAL at 08:05

## 2023-05-27 RX ADMIN — THIAMINE HYDROCHLORIDE 100 MG: 100 INJECTION, SOLUTION INTRAMUSCULAR; INTRAVENOUS at 03:05

## 2023-05-27 RX ADMIN — SODIUM CHLORIDE, POTASSIUM CHLORIDE, SODIUM LACTATE AND CALCIUM CHLORIDE 1000 ML: 600; 310; 30; 20 INJECTION, SOLUTION INTRAVENOUS at 07:05

## 2023-05-27 RX ADMIN — METOPROLOL TARTRATE 100 MG: 25 TABLET, FILM COATED ORAL at 08:05

## 2023-05-27 NOTE — ED PROVIDER NOTES
"Encounter Date: 5/27/2023       History     Chief Complaint   Patient presents with    Alcohol Problem     Patient states that he is an alcoholic and has been trying to detox at home for the past 3 days and has been suffering with nausea, vomiting, "blackout seizures" approximately 5-6 times over the past week. Patient is supposed to go to rehab on Wednesday.      52-year-old male, here from home via private vehicle, complaining of alcohol withdrawal symptoms.  He states that over the last 3 days he has been trying to detox at home, using Librium prescription given to him by Dr. Stubbs.  Patient states that he has been nauseous, he has been vomiting, he states that he has been having "blackout seizures" and he states that he simply can not detox at home by himself.  He plans to go to alcohol rehab facility on Tuesday, 3 days from now.  He does admit that he had some alcohol at around 1:00 p.m. today.     Review of patient's allergies indicates:  No Known Allergies  Past Medical History:   Diagnosis Date    Bipolar disorder     History of alcohol abuse     Hypertension      Past Surgical History:   Procedure Laterality Date    ANTERIOR CRUCIATE LIGAMENT REPAIR      BACK SURGERY      CHOLECYSTECTOMY      GASTROPLASTY, SLEEVE, ENDOSCOPIC       Family History   Problem Relation Age of Onset    Hypertension Mother     Hypertension Father      Social History     Tobacco Use    Smoking status: Every Day     Packs/day: 1.00     Types: Cigarettes    Smokeless tobacco: Never   Substance Use Topics    Alcohol use: Not Currently    Drug use: Never     Review of Systems   Constitutional: Negative.    HENT: Negative.     Eyes: Negative.    Respiratory: Negative.     Cardiovascular: Negative.    Gastrointestinal:  Positive for nausea and vomiting.   Endocrine: Negative.    Genitourinary: Negative.    Musculoskeletal: Negative.    Skin: Negative.    Allergic/Immunologic: Negative.    Neurological:  Positive for tremors and " seizures.   Hematological: Negative.    Psychiatric/Behavioral:  Positive for decreased concentration and sleep disturbance. Negative for suicidal ideas. The patient is nervous/anxious.      Physical Exam     Initial Vitals [05/27/23 1446]   BP Pulse Resp Temp SpO2   (!) 127/91 96 18 99.6 °F (37.6 °C) 95 %      MAP       --         Physical Exam    Nursing note and vitals reviewed.  Constitutional: He appears well-developed and well-nourished. He is not diaphoretic. He appears distressed.   HENT:   Head: Normocephalic and atraumatic.   Nose: Nose normal.   Mouth/Throat: Oropharynx is clear and moist. No oropharyngeal exudate.   Eyes: Conjunctivae and EOM are normal. Pupils are equal, round, and reactive to light. No scleral icterus.   Neck: Neck supple. No JVD present.   Normal range of motion.  Cardiovascular:  Normal rate, regular rhythm, normal heart sounds and intact distal pulses.           Pulmonary/Chest: Breath sounds normal. No stridor. No respiratory distress. He has no wheezes. He has no rhonchi. He has no rales.   Abdominal: Abdomen is soft. Bowel sounds are normal. He exhibits no distension. There is no abdominal tenderness.   Musculoskeletal:         General: No tenderness or edema. Normal range of motion.      Cervical back: Normal range of motion and neck supple.     Neurological: He is alert and oriented to person, place, and time. He has normal strength. No cranial nerve deficit or sensory deficit. GCS score is 15. GCS eye subscore is 4. GCS verbal subscore is 5. GCS motor subscore is 6.   Skin: Skin is warm and dry. Capillary refill takes less than 2 seconds. No rash noted. No erythema.   Psychiatric:   Patient is anxious       ED Course   Procedures  Labs Reviewed   CBC W/ AUTO DIFFERENTIAL - Abnormal; Notable for the following components:       Result Value    MCH 33.5 (*)     MCHC 36.3 (*)     MPV 9.1 (*)     All other components within normal limits    Narrative:     Release to  "patient->Immediate  Recoll. [id number] by [facility] at 05/27/2023 15:44, reason: Specimen   clotted contacted Rom[facility]/ED   COMPREHENSIVE METABOLIC PANEL - Abnormal; Notable for the following components:    CO2 19 (*)     Anion Gap 18 (*)     All other components within normal limits    Narrative:     Release to patient->Immediate   ALCOHOL,MEDICAL (ETHANOL) - Abnormal; Notable for the following components:    Alcohol, Serum 169 (*)     All other components within normal limits   URINALYSIS, REFLEX TO URINE CULTURE - Abnormal; Notable for the following components:    Protein, UA Trace (*)     Ketones, UA Trace (*)     All other components within normal limits    Narrative:     Preferred Collection Type->Urine, Clean Catch  Specimen Source->Urine   DRUG SCREEN PANEL, URINE EMERGENCY - Abnormal; Notable for the following components:    Benzodiazepines Presumptive Positive (*)     THC Presumptive Positive (*)     All other components within normal limits    Narrative:     Preferred Collection Type->Urine, Clean Catch  Specimen Source->Urine   LIPASE    Narrative:     Release to patient->Immediate   HIV 1 / 2 ANTIBODY   HEPATITIS C ANTIBODY          Imaging Results    None          Medications   folic acid tablet 1 mg (1 mg Oral Given 5/27/23 1538)   LORazepam injection 1 mg (1 mg Intravenous Given 5/27/23 1528)   ondansetron injection 4 mg (4 mg Intravenous Given 5/27/23 1530)   thiamine (B-1) 100 mg in dextrose 5 % (D5W) 100 mL IVPB (0 mg Intravenous Stopped 5/27/23 1604)     Medical Decision Making:   Differential Diagnosis:   Alcohol withdrawal, seizures, electrolyte abnormalities, delirium tremens, etc.  ED Management:  Labs were drawn, and patient was given 1 mg Ativan.  He admits that he is feeling somewhat better.  Patient maintains that he has been having seizures and "blackouts" at home, although none of this has been seen here in the emergency department.  His labs do not show any significant abnormalities.  Alcohol " level was 169.  I spoke to Dr. Thompson, hospitalist on duty who stated that patient does not meet inpatient criteria, but she offered to observe the patient overnight to make sure that he does not began showing withdrawal symptoms such as seizures etc..  Patient is in agreement with this.                         Clinical Impression:   Final diagnoses:  [F10.10] Alcohol abuse (Primary)  [F10.930] Alcohol withdrawal syndrome without complication        ED Disposition Condition    Observation Stable                Solitario Huerta MD  05/27/23 3853

## 2023-05-27 NOTE — SUBJECTIVE & OBJECTIVE
Past Medical History:   Diagnosis Date    Bipolar disorder     History of alcohol abuse     Hypertension        Past Surgical History:   Procedure Laterality Date    ANTERIOR CRUCIATE LIGAMENT REPAIR      BACK SURGERY      CHOLECYSTECTOMY      GASTROPLASTY, SLEEVE, ENDOSCOPIC         Review of patient's allergies indicates:  No Known Allergies    No current facility-administered medications on file prior to encounter.     Current Outpatient Medications on File Prior to Encounter   Medication Sig    chlordiazepoxide (LIBRIUM) 25 MG Cap Day 1: 50 to 100 mg every 4 to 6 hours as needed based on symptoms, Day 2: 50 to 100 mg every 6 to 8 hours as needed, Day 3: 50 to 100 mg every 12 hours as needed    hydroCHLOROthiazide (HYDRODIURIL) 25 MG tablet Take 1 tablet (25 mg total) by mouth once daily.    losartan (COZAAR) 25 MG tablet Take 1 tablet (25 mg total) by mouth once daily.    lurasidone (LATUDA) 20 mg Tab tablet Take 1 tablet (20 mg total) by mouth every evening.    nebivoloL (BYSTOLIC) 20 mg Tab Take 20 mg by mouth once daily at 6am.    sildenafil (REVATIO) 20 mg Tab Take 1-3 tablets (20-60 mg total) by mouth daily as needed (erectile dysfunction).    traZODone (DESYREL) 150 MG tablet Take 1 tablet (150 mg total) by mouth every evening.     Family History       Problem Relation (Age of Onset)    Hypertension Mother, Father          Tobacco Use    Smoking status: Every Day     Packs/day: 1.00     Types: Cigarettes    Smokeless tobacco: Never   Substance and Sexual Activity    Alcohol use: Not Currently    Drug use: Never    Sexual activity: Not on file     Review of Systems   Constitutional:  Positive for appetite change. Negative for chills and fever.   HENT:  Negative for congestion and sore throat.    Eyes:  Negative for redness and visual disturbance.   Respiratory:  Negative for cough and shortness of breath.    Cardiovascular:  Negative for chest pain and leg swelling.   Gastrointestinal:  Positive for nausea  and vomiting. Negative for abdominal pain.   Genitourinary:  Positive for decreased urine volume. Negative for dysuria.   Musculoskeletal:  Positive for gait problem.   Neurological:  Positive for syncope and light-headedness. Negative for seizures.   All other systems reviewed and are negative.  Objective:     Vital Signs (Most Recent):  Temp: 98.2 °F (36.8 °C) (05/27/23 1730)  Pulse: 74 (05/27/23 1730)  Resp: 18 (05/27/23 1730)  BP: 138/86 (05/27/23 1730)  SpO2: 97 % (05/27/23 1730) Vital Signs (24h Range):  Temp:  [98.2 °F (36.8 °C)-99.6 °F (37.6 °C)] 98.2 °F (36.8 °C)  Pulse:  [74-96] 74  Resp:  [12-18] 18  SpO2:  [95 %-98 %] 97 %  BP: (105-138)/(65-91) 138/86     Weight: 110.9 kg (244 lb 7.8 oz)  Body mass index is 29.76 kg/m².     Physical Exam  Constitutional:       Appearance: Normal appearance.   HENT:      Head: Normocephalic and atraumatic.      Nose: Nose normal.      Mouth/Throat:      Mouth: Mucous membranes are dry.   Eyes:      Extraocular Movements: Extraocular movements intact.      Pupils: Pupils are equal, round, and reactive to light.   Cardiovascular:      Rate and Rhythm: Normal rate and regular rhythm.      Pulses: Normal pulses.   Pulmonary:      Effort: Pulmonary effort is normal. No respiratory distress.   Abdominal:      General: There is no distension.      Tenderness: There is no abdominal tenderness.   Skin:     General: Skin is warm and dry.   Neurological:      General: No focal deficit present.      Mental Status: He is alert and oriented to person, place, and time.      Cranial Nerves: No cranial nerve deficit.      Comments: Mildly tremulous, no asterixis    Psychiatric:         Mood and Affect: Mood normal.         Behavior: Behavior normal.            CRANIAL NERVES     CN III, IV, VI   Pupils are equal, round, and reactive to light.     Significant Labs: All pertinent labs within the past 24 hours have been reviewed.  Lipase:   Recent Labs   Lab 05/27/23  1530   LIPASE 50        Significant Imaging: I have reviewed all pertinent imaging results/findings within the past 24 hours.

## 2023-05-27 NOTE — HPI
Mr. Reyes is a 52M with PMH of HTN, BYRON on CPAP, ETOH use disorder, presenting in EtOH withdrawals.  States he is having black out episodes, last two days ago, denies head trauma, nausea, decreased PO intake and tremors.  Has been on librium taper prescribed by PCP as bridge to get to inpatient EtoH detox on Wednesday in [facility], but feels the librium is not working well enough.  Last drink today as he was trying to self medicate and supplement the librium.  Requesting temporary admission for EtOH w/d.  In ED, was found to have normal vitals, labs notable for low CO with AG 19, elevated EtOH level, tox positive for benzodiazepines and THC.  He did endorse taking two THC gummies for sleep as well.  Received thiamine and ativan.     Of note, employee at [employer].

## 2023-05-27 NOTE — ASSESSMENT & PLAN NOTE
Mild w/d symptoms, Last drink earlier today with elevated EtOH level  Valium BID, adjust as needed  CIWA q 4, ativan PRN CIWA >8  Seizure, fall precautions  Tele

## 2023-05-27 NOTE — ASSESSMENT & PLAN NOTE
Continue trazadone qhs  On latuda, but did not bring with him and is ok with not taking for a night or two

## 2023-05-27 NOTE — ASSESSMENT & PLAN NOTE
Mild w/d symptoms, Last drink day of admission with elevated EtOH level  Valium changed from BID to TID  CIWA q 4, ativan PRN CIWA >8  MV, thamine, folate, continued at discharge  Discharged on valium taper, stopped librium

## 2023-05-27 NOTE — H&P
"[facility] Medicine  History & Physical    Patient Name: Georgette Reyes  MRN: [id number]  Patient Class: OP- Observation  Admission Date: 5/27/2023  Attending Physician: Melyssa Thompson MD   Primary Care Provider: Raymundo Stubbs MD         Patient information was obtained from patient, past medical records and ER records.     Subjective:     Principal Problem:Alcohol withdrawal syndrome with complication    Chief Complaint:   Chief Complaint   Patient presents with    Alcohol Problem     Patient states that he is an alcoholic and has been trying to detox at home for the past 3 days and has been suffering with nausea, vomiting, "blackout seizures" approximately 5-6 times over the past week. Patient is supposed to go to rehab on Wednesday.         HPI: Mr. Reyes is a 52M with PMH of HTN, BYRON on CPAP, ETOH use disorder, presenting in EtOH withdrawals.  States he is having black out episodes, last two days ago, denies head trauma, nausea, decreased PO intake and tremors.  Has been on librium taper prescribed by PCP as bridge to get to inpatient EtoH detox on Wednesday in [facility], but feels the librium is not working well enough.  Last drink today as he was trying to self medicate and supplement the librium.  Requesting temporary admission for EtOH w/d.  In ED, was found to have normal vitals, labs notable for low CO with AG 19, elevated EtOH level, tox positive for benzodiazepines and THC.  He did endorse taking two THC gummies for sleep as well.  Received thiamine and ativan.     Of note, employee at [employer].       Past Medical History:   Diagnosis Date    Bipolar disorder     History of alcohol abuse     Hypertension        Past Surgical History:   Procedure Laterality Date    ANTERIOR CRUCIATE LIGAMENT REPAIR      BACK SURGERY      CHOLECYSTECTOMY      GASTROPLASTY, SLEEVE, ENDOSCOPIC         Review of patient's allergies indicates:  No Known Allergies    No current " facility-administered medications on file prior to encounter.     Current Outpatient Medications on File Prior to Encounter   Medication Sig    chlordiazepoxide (LIBRIUM) 25 MG Cap Day 1: 50 to 100 mg every 4 to 6 hours as needed based on symptoms, Day 2: 50 to 100 mg every 6 to 8 hours as needed, Day 3: 50 to 100 mg every 12 hours as needed    hydroCHLOROthiazide (HYDRODIURIL) 25 MG tablet Take 1 tablet (25 mg total) by mouth once daily.    losartan (COZAAR) 25 MG tablet Take 1 tablet (25 mg total) by mouth once daily.    lurasidone (LATUDA) 20 mg Tab tablet Take 1 tablet (20 mg total) by mouth every evening.    nebivoloL (BYSTOLIC) 20 mg Tab Take 20 mg by mouth once daily at 6am.    sildenafil (REVATIO) 20 mg Tab Take 1-3 tablets (20-60 mg total) by mouth daily as needed (erectile dysfunction).    traZODone (DESYREL) 150 MG tablet Take 1 tablet (150 mg total) by mouth every evening.     Family History       Problem Relation (Age of Onset)    Hypertension Mother, Father          Tobacco Use    Smoking status: Every Day     Packs/day: 1.00     Types: Cigarettes    Smokeless tobacco: Never   Substance and Sexual Activity    Alcohol use: Not Currently    Drug use: Never    Sexual activity: Not on file     Review of Systems   Constitutional:  Positive for appetite change. Negative for chills and fever.   HENT:  Negative for congestion and sore throat.    Eyes:  Negative for redness and visual disturbance.   Respiratory:  Negative for cough and shortness of breath.    Cardiovascular:  Negative for chest pain and leg swelling.   Gastrointestinal:  Positive for nausea and vomiting. Negative for abdominal pain.   Genitourinary:  Positive for decreased urine volume. Negative for dysuria.   Musculoskeletal:  Positive for gait problem.   Neurological:  Positive for syncope and light-headedness. Negative for seizures.   All other systems reviewed and are negative.  Objective:     Vital Signs (Most Recent):  Temp:  98.2 °F (36.8 °C) (05/27/23 1730)  Pulse: 74 (05/27/23 1730)  Resp: 18 (05/27/23 1730)  BP: 138/86 (05/27/23 1730)  SpO2: 97 % (05/27/23 1730) Vital Signs (24h Range):  Temp:  [98.2 °F (36.8 °C)-99.6 °F (37.6 °C)] 98.2 °F (36.8 °C)  Pulse:  [74-96] 74  Resp:  [12-18] 18  SpO2:  [95 %-98 %] 97 %  BP: (105-138)/(65-91) 138/86     Weight: 110.9 kg (244 lb 7.8 oz)  Body mass index is 29.76 kg/m².     Physical Exam  Constitutional:       Appearance: Normal appearance.   HENT:      Head: Normocephalic and atraumatic.      Nose: Nose normal.      Mouth/Throat:      Mouth: Mucous membranes are dry.   Eyes:      Extraocular Movements: Extraocular movements intact.      Pupils: Pupils are equal, round, and reactive to light.   Cardiovascular:      Rate and Rhythm: Normal rate and regular rhythm.      Pulses: Normal pulses.   Pulmonary:      Effort: Pulmonary effort is normal. No respiratory distress.   Abdominal:      General: There is no distension.      Tenderness: There is no abdominal tenderness.   Skin:     General: Skin is warm and dry.   Neurological:      General: No focal deficit present.      Mental Status: He is alert and oriented to person, place, and time.      Cranial Nerves: No cranial nerve deficit.      Comments: Mildly tremulous, no asterixis    Psychiatric:         Mood and Affect: Mood normal.         Behavior: Behavior normal.            CRANIAL NERVES     CN III, IV, VI   Pupils are equal, round, and reactive to light.     Significant Labs: All pertinent labs within the past 24 hours have been reviewed.  Lipase:   Recent Labs   Lab 05/27/23  1530   LIPASE 50       Significant Imaging: I have reviewed all pertinent imaging results/findings within the past 24 hours.    Assessment/Plan:     * Alcohol withdrawal syndrome with complication  Mild w/d symptoms, Last drink earlier today with elevated EtOH level  Valium BID, adjust as needed  CIWA q 4, ativan PRN CIWA >8  Seizure, fall precautions  Tele      Nausea and vomiting  IVF bolus  CLD, advance as tolerated  PRN antiemetics        Alcohol abuse  Per EtOH w/d    Cigarette smoker  Nicotine patch PRN  Counseled on smoking cessation, not interested in quitting at this time      Generalized anxiety disorder  Continue trazadone qhs  On latuda, but did not bring with him and is ok with not taking for a night or two      BYRON on CPAP  Continue home CPAP at 11      Essential hypertension  Continue home meds        VTE Risk Mitigation (From admission, onward)         Ordered     IP VTE LOW RISK PATIENT  Once         05/27/23 1734     Place sequential compression device  Until discontinued         05/27/23 1734                   On 05/27/2023, patient should be placed in hospital observation services under my care.        Melyssa Thmopson MD  Department of Hospital Medicine  [place] - Intensive Care

## 2023-05-28 VITALS
HEART RATE: 68 BPM | OXYGEN SATURATION: 95 % | RESPIRATION RATE: 16 BRPM | WEIGHT: 244.5 LBS | TEMPERATURE: 98 F | BODY MASS INDEX: 29.77 KG/M2 | HEIGHT: 76 IN | DIASTOLIC BLOOD PRESSURE: 77 MMHG | SYSTOLIC BLOOD PRESSURE: 121 MMHG

## 2023-05-28 LAB
ALBUMIN SERPL BCP-MCNC: 3.6 G/DL (ref 3.5–5.2)
ALP SERPL-CCNC: 77 U/L (ref 55–135)
ALT SERPL W/O P-5'-P-CCNC: 15 U/L (ref 10–44)
ANION GAP SERPL CALC-SCNC: 10 MMOL/L (ref 8–16)
AST SERPL-CCNC: 21 U/L (ref 10–40)
BILIRUB SERPL-MCNC: 1.6 MG/DL (ref 0.1–1)
BUN SERPL-MCNC: 12 MG/DL (ref 6–20)
CALCIUM SERPL-MCNC: 8.8 MG/DL (ref 8.7–10.5)
CHLORIDE SERPL-SCNC: 104 MMOL/L (ref 95–110)
CO2 SERPL-SCNC: 26 MMOL/L (ref 23–29)
CREAT SERPL-MCNC: 0.9 MG/DL (ref 0.5–1.4)
ERYTHROCYTE [DISTWIDTH] IN BLOOD BY AUTOMATED COUNT: 13.6 % (ref 11.5–14.5)
EST. GFR  (NO RACE VARIABLE): >60 ML/MIN/1.73 M^2
GLUCOSE SERPL-MCNC: 90 MG/DL (ref 70–110)
HCT VFR BLD AUTO: 41.1 % (ref 40–54)
HCV AB SERPL QL IA: NORMAL
HGB BLD-MCNC: 14.6 G/DL (ref 14–18)
HIV 1+2 AB+HIV1 P24 AG SERPL QL IA: NORMAL
MAGNESIUM SERPL-MCNC: 1.6 MG/DL (ref 1.6–2.6)
MCH RBC QN AUTO: 33.2 PG (ref 27–31)
MCHC RBC AUTO-ENTMCNC: 35.5 G/DL (ref 32–36)
MCV RBC AUTO: 93 FL (ref 82–98)
PLATELET # BLD AUTO: 147 K/UL (ref 150–450)
PMV BLD AUTO: 9 FL (ref 9.2–12.9)
POTASSIUM SERPL-SCNC: 3.4 MMOL/L (ref 3.5–5.1)
PROT SERPL-MCNC: 5.7 G/DL (ref 6–8.4)
RBC # BLD AUTO: 4.4 M/UL (ref 4.6–6.2)
SODIUM SERPL-SCNC: 140 MMOL/L (ref 136–145)
WBC # BLD AUTO: 6.5 K/UL (ref 3.9–12.7)

## 2023-05-28 PROCEDURE — 36415 COLL VENOUS BLD VENIPUNCTURE: CPT | Performed by: STUDENT IN AN ORGANIZED HEALTH CARE EDUCATION/TRAINING PROGRAM

## 2023-05-28 PROCEDURE — 80053 COMPREHEN METABOLIC PANEL: CPT | Performed by: STUDENT IN AN ORGANIZED HEALTH CARE EDUCATION/TRAINING PROGRAM

## 2023-05-28 PROCEDURE — 99239 PR HOSPITAL DISCHARGE DAY,>30 MIN: ICD-10-PCS | Mod: ,,, | Performed by: STUDENT IN AN ORGANIZED HEALTH CARE EDUCATION/TRAINING PROGRAM

## 2023-05-28 PROCEDURE — S4991 NICOTINE PATCH NONLEGEND: HCPCS | Performed by: STUDENT IN AN ORGANIZED HEALTH CARE EDUCATION/TRAINING PROGRAM

## 2023-05-28 PROCEDURE — 94761 N-INVAS EAR/PLS OXIMETRY MLT: CPT

## 2023-05-28 PROCEDURE — 99239 HOSP IP/OBS DSCHRG MGMT >30: CPT | Mod: ,,, | Performed by: STUDENT IN AN ORGANIZED HEALTH CARE EDUCATION/TRAINING PROGRAM

## 2023-05-28 PROCEDURE — 63600175 PHARM REV CODE 636 W HCPCS: Performed by: STUDENT IN AN ORGANIZED HEALTH CARE EDUCATION/TRAINING PROGRAM

## 2023-05-28 PROCEDURE — 96376 TX/PRO/DX INJ SAME DRUG ADON: CPT

## 2023-05-28 PROCEDURE — 85027 COMPLETE CBC AUTOMATED: CPT | Performed by: STUDENT IN AN ORGANIZED HEALTH CARE EDUCATION/TRAINING PROGRAM

## 2023-05-28 PROCEDURE — G0378 HOSPITAL OBSERVATION PER HR: HCPCS

## 2023-05-28 PROCEDURE — 83735 ASSAY OF MAGNESIUM: CPT | Performed by: STUDENT IN AN ORGANIZED HEALTH CARE EDUCATION/TRAINING PROGRAM

## 2023-05-28 PROCEDURE — 25000003 PHARM REV CODE 250: Performed by: STUDENT IN AN ORGANIZED HEALTH CARE EDUCATION/TRAINING PROGRAM

## 2023-05-28 RX ORDER — DIAZEPAM 5 MG/1
TABLET ORAL
Qty: 9 TABLET | Refills: 0 | OUTPATIENT
Start: 2023-05-28 | End: 2023-09-08

## 2023-05-28 RX ORDER — DIAZEPAM 5 MG/1
5 TABLET ORAL 3 TIMES DAILY
Status: DISCONTINUED | OUTPATIENT
Start: 2023-05-28 | End: 2023-05-28 | Stop reason: HOSPADM

## 2023-05-28 RX ORDER — ONDANSETRON 4 MG/1
4 TABLET, ORALLY DISINTEGRATING ORAL EVERY 6 HOURS PRN
Qty: 30 TABLET | Refills: 0 | Status: SHIPPED | OUTPATIENT
Start: 2023-05-28 | End: 2023-06-07

## 2023-05-28 RX ORDER — FOLIC ACID 1 MG/1
1 TABLET ORAL DAILY
Qty: 30 TABLET | Refills: 11 | Status: SHIPPED | OUTPATIENT
Start: 2023-05-29 | End: 2023-05-29 | Stop reason: SDUPTHER

## 2023-05-28 RX ORDER — LANOLIN ALCOHOL/MO/W.PET/CERES
100 CREAM (GRAM) TOPICAL DAILY
Qty: 30 TABLET | Refills: 0 | Status: SHIPPED | OUTPATIENT
Start: 2023-05-29 | End: 2023-05-29 | Stop reason: SDUPTHER

## 2023-05-28 RX ORDER — DIAZEPAM 5 MG/1
TABLET ORAL
Qty: 9 TABLET | Refills: 0 | Status: SHIPPED | OUTPATIENT
Start: 2023-05-28 | End: 2023-05-28 | Stop reason: SDUPTHER

## 2023-05-28 RX ORDER — DIAZEPAM 5 MG/1
5 TABLET ORAL 3 TIMES DAILY
Status: DISCONTINUED | OUTPATIENT
Start: 2023-05-28 | End: 2023-05-28

## 2023-05-28 RX ADMIN — LORAZEPAM 2 MG: 2 INJECTION INTRAMUSCULAR; INTRAVENOUS at 12:05

## 2023-05-28 RX ADMIN — Medication 100 MG: at 08:05

## 2023-05-28 RX ADMIN — FOLIC ACID 1 MG: 1 TABLET ORAL at 08:05

## 2023-05-28 RX ADMIN — METOPROLOL TARTRATE 100 MG: 25 TABLET, FILM COATED ORAL at 08:05

## 2023-05-28 RX ADMIN — LORAZEPAM 2 MG: 2 INJECTION INTRAMUSCULAR; INTRAVENOUS at 05:05

## 2023-05-28 RX ADMIN — THERA TABS 1 TABLET: TAB at 08:05

## 2023-05-28 RX ADMIN — DIAZEPAM 5 MG: 5 TABLET ORAL at 08:05

## 2023-05-28 RX ADMIN — Medication 1 PATCH: at 08:05

## 2023-05-28 RX ADMIN — DIAZEPAM 5 MG: 5 TABLET ORAL at 02:05

## 2023-05-28 NOTE — HOSPITAL COURSE
42M admitted to floor with telemetry for mild alcohol withdrawal symptoms.  Symptom controlled/improved with scheduled valium and prn ativan.  Tolerating PO. States he feels valium works better than librium or ativan for symptoms.  Desires discharge home until he goes to [facility] for EtOH detox on Tuesday-Wednesday. Will change librium to valium taper until then.  Counseled extensively not to combine or drink while taking this medication, drive.

## 2023-05-28 NOTE — PLAN OF CARE
Problem: Gas Exchange Impaired  Goal: Optimal Gas Exchange  Outcome: Ongoing, Progressing   Patient in no apparent distress. Sat's 95  % on  room air. Home CPAP machine in use for night time. Will continue to monitor.

## 2023-05-28 NOTE — ASSESSMENT & PLAN NOTE
Will stop trazodone qhs as he states this does not work for sleep, potential interaction with benzodiazepines   On latuda, but did not bring with him and is ok with not taking for a night or two

## 2023-05-28 NOTE — DISCHARGE SUMMARY
[facility]  Discharge Summary      Patient Name: Georgette Reyes  MRN: [id number]  CLEMENCIA: [id number]  Patient Class: OP- Observation  Admission Date: 5/27/2023  Hospital Length of Stay: 0 days  Discharge Date and Time:  05/28/2023 10:03 AM  Attending Physician: Melyssa Thompson MD   Discharging Provider: Mleyssa Thompson MD  Primary Care Provider: Raymundo Stubbs MD    Primary Care Team: Networked reference to record PCT     HPI:   Mr. Reyes is a 52M with PMH of HTN, BYRON on CPAP, ETOH use disorder, presenting in EtOH withdrawals.  States he is having black out episodes, last two days ago, denies head trauma, nausea, decreased PO intake and tremors.  Has been on librium taper prescribed by PCP as bridge to get to inpatient EtoH detox on Wednesday in [facility], but feels the librium is not working well enough.  Last drink today as he was trying to self medicate and supplement the librium.  Requesting temporary admission for EtOH w/d.  In ED, was found to have normal vitals, labs notable for low CO with AG 19, elevated EtOH level, tox positive for benzodiazepines and THC.  He did endorse taking two THC gummies for sleep as well.  Received thiamine and ativan.     Of note, employee at [employer].       * No surgery found *      Hospital Course:   42M admitted to floor with telemetry for mild alcohol withdrawal symptoms.  Symptom controlled/improved with scheduled valium and prn ativan.  Tolerating PO. States he feels valium works better than librium or ativan for symptoms.  Desires discharge home until he goes to [facility] for EtOH detox on Tuesday-Wednesday. Will change librium to valium taper until then.  Counseled extensively not to combine or drink while taking this medication, drive.        Goals of Care Treatment Preferences:  Code Status: Full Code      Consults:     Cardiac/Vascular  Essential hypertension  Continue home meds      Renal/  High anion gap metabolic  acidosis  AG 19  Suspect due to EtOH level, resolved      GI  Nausea and vomiting  IVF bolus  CLD, advanced as tolerated  PRN antiemetics, prescribed zofran prn at discharge        Other  * Alcohol withdrawal syndrome with complication  Mild w/d symptoms, Last drink day of admission with elevated EtOH level  Valium changed from BID to TID  CIWA q 4, ativan PRN CIWA >8  MV, thamine, folate, continued at discharge  Discharged on valium taper, stopped librium    Alcohol abuse  Per EtOH w/d    Cigarette smoker  Nicotine patch PRN  Counseled on smoking cessation, not interested in quitting at this time      Generalized anxiety disorder  Will stop trazodone qhs as he states this does not work for sleep, potential interaction with benzodiazepines   On latuda, but did not bring with him and is ok with not taking for a night or two      BYRON on CPAP  Continue home CPAP at 11        Final Active Diagnoses:    Diagnosis Date Noted POA    PRINCIPAL PROBLEM:  Alcohol withdrawal syndrome with complication [F10.939] 05/27/2023 Yes    Nausea and vomiting [R11.2] 05/27/2023 Yes    High anion gap metabolic acidosis [E87.29] 05/27/2023 Yes    Alcohol abuse [F10.10] 12/28/2022 Yes    Generalized anxiety disorder [F41.1] 12/28/2022 Yes    Cigarette smoker [F17.210] 12/28/2022 Yes    Essential hypertension [I10] 07/18/2022 Yes    BYRON on CPAP [G47.33, Z99.89] 07/18/2022 Not Applicable      Problems Resolved During this Admission:       Discharged Condition: stable    Disposition: Home or Self Care    Follow Up:   Follow-up Information     Raymundo Stubbs MD. Schedule an appointment as soon as possible for a visit in 2 day(s).    Specialty: Family Medicine  Why: For follow-up of hospitalization  Contact information:  [address] [place] [address]  [phone]             [facility] on 5/[facility]/2023.    Why: per your scheduled appointment                     Patient Instructions:   No discharge procedures on  file.    Significant Diagnostic Studies: Labs:   [facility]   Recent Labs   Lab 05/27/23  1530 05/28/23  0649    140   K 3.8 3.4*    104   CO2 19* 26    90   BUN 9 12   CREATININE 1.0 0.9   CALCIUM 9.6 8.8   PROT 7.2 5.7*   ALBUMIN 4.2 3.6   BILITOT 0.9 1.6*   ALKPHOS 97 77   AST 31 21   ALT 18 15   ANIONGAP 18* 10       Pending Diagnostic Studies:     Procedure Component Value Units Date/Time    HIV 1/2 Ag/Ab (4th Gen) [[id number]] Collected: 05/27/23 1530    Order Status: Sent Lab Status: In process Updated: 05/27/23 1531    Specimen: Blood     Hepatitis C Antibody [[id number]] Collected: 05/27/23 1530    Order Status: Sent Lab Status: In process Updated: 05/27/23 1531    Specimen: Blood          Medications:  Reconciled Home Medications:      Medication List      START taking these medications    diazePAM 5 MG tablet  Commonly known as: VALIUM  Take 1 tablet (5 mg total) by mouth 3 (three) times daily for 2 days, THEN 1 tablet (5 mg total) 2 (two) times a day for 1 day, THEN 0.5 tablets (2.5 mg total) 2 (two) times a day for 1 day.  Start taking on: May 28, 2023     folic acid 1 MG tablet  Commonly known as: FOLVITE  Take 1 tablet (1 mg total) by mouth once daily.  Start taking on: May 29, 2023     multivitamin Tab  Take 1 tablet by mouth once daily.  Start taking on: May 29, 2023     ondansetron 4 MG Tbdl  Commonly known as: ZOFRAN-ODT  Take 1 tablet (4 mg total) by mouth every 6 (six) hours as needed (nausea and vomiting).     thiamine 100 MG tablet  Take 1 tablet (100 mg total) by mouth once daily.  Start taking on: May 29, 2023        CONTINUE taking these medications    losartan 25 MG tablet  Commonly known as: COZAAR  Take 1 tablet (25 mg total) by mouth once daily.     lurasidone 20 mg Tab tablet  Commonly known as: LATUDA  Take 1 tablet (20 mg total) by mouth every evening.     nebivoloL 20 mg Tab  Commonly known as: BYSTOLIC  Take 20 mg by mouth once daily at 6am.     sildenafil 20 mg  Tab  Commonly known as: REVATIO  Take 1-3 tablets (20-60 mg total) by mouth daily as needed (erectile dysfunction).        STOP taking these medications    chlordiazepoxide 25 MG Cap  Commonly known as: LIBRIUM     hydroCHLOROthiazide 25 MG tablet  Commonly known as: HYDRODIURIL     traZODone 150 MG tablet  Commonly known as: DESYREL            Indwelling Lines/Drains at time of discharge:   Lines/Drains/Airways     None                 Time spent on the discharge of patient: 40 minutes         Melyssa Thompson MD  Department of [facility] Medicine  [place] - Intensive Care

## 2023-05-29 DIAGNOSIS — I10 ESSENTIAL HYPERTENSION: ICD-10-CM

## 2023-05-29 RX ORDER — LANOLIN ALCOHOL/MO/W.PET/CERES
100 CREAM (GRAM) TOPICAL DAILY
Qty: 90 TABLET | Refills: 0 | Status: SHIPPED | OUTPATIENT
Start: 2023-05-29 | End: 2023-06-28

## 2023-05-29 RX ORDER — NEBIVOLOL 20 MG/1
20 TABLET ORAL DAILY
Qty: 30 TABLET | Refills: 0 | Status: SHIPPED | OUTPATIENT
Start: 2023-05-29 | End: 2023-05-30

## 2023-05-29 RX ORDER — LURASIDONE HYDROCHLORIDE 20 MG/1
20 TABLET, FILM COATED ORAL NIGHTLY
Qty: 90 TABLET | Refills: 3 | Status: SHIPPED | OUTPATIENT
Start: 2023-05-29 | End: 2023-05-30 | Stop reason: SDUPTHER

## 2023-05-29 RX ORDER — LOSARTAN POTASSIUM 25 MG/1
25 TABLET ORAL DAILY
Qty: 90 TABLET | Refills: 3 | Status: SHIPPED | OUTPATIENT
Start: 2023-05-29 | End: 2023-05-29 | Stop reason: SDUPTHER

## 2023-05-29 RX ORDER — NEBIVOLOL 20 MG/1
20 TABLET ORAL DAILY
Qty: 90 TABLET | Refills: 3 | Status: SHIPPED | OUTPATIENT
Start: 2023-05-29 | End: 2023-05-29 | Stop reason: SDUPTHER

## 2023-05-29 RX ORDER — FOLIC ACID 1 MG/1
1 TABLET ORAL DAILY
Qty: 90 TABLET | Refills: 3 | Status: SHIPPED | OUTPATIENT
Start: 2023-05-29 | End: 2024-05-28

## 2023-05-29 RX ORDER — LOSARTAN POTASSIUM 25 MG/1
25 TABLET ORAL DAILY
Qty: 30 TABLET | Refills: 0 | Status: SHIPPED | OUTPATIENT
Start: 2023-05-29 | End: 2023-05-30 | Stop reason: SDUPTHER

## 2023-05-29 NOTE — PLAN OF CARE
[facility] - Intensive Care  Discharge Final Note    Primary Care Provider: Raymundo Stubbs MD    Expected Discharge Date: 5/28/2023    Called patient with appointment on Wed May 31st with Dr Stubbs. States he can't go as has to be at rehab on May 31st by 2:30pm. Able to change appointment to tomorrow at 7:00am & he can discuss with him about calling in the refills he needs for rehab. No other needs at this time.      Final Discharge Note (most recent)       Final Note - 05/29/23 1531          Final Note    Assessment Type Final Discharge Note     Anticipated Discharge Disposition Home or Self Care     What phone number can be called within the next 1-3 days to see how you are doing after discharge? [phone]                     Important Message from Medicare             Contact Info       Raymundo Stubbs MD   Specialty: Family Medicine   Relationship: PCP - General    [address] [place] [id number]   Phone: [phone]       Next Steps: Go on 5/30/2023    Instructions: appointment Tuesday May 30th at 7:00am for follow-up of hospitalization    [facility]        Next Steps: Go on 5/31/2023    Instructions: per your scheduled appointment

## 2023-05-29 NOTE — PLAN OF CARE
[facility] - Intensive Care  Discharge Final Note    Primary Care Provider: Raymundo Stubbs MD    Expected Discharge Date: 5/28/2023  Patient discharged home- Patient has hospital follow up appointment with Dr. Stubbs on 5/31/23 at 2:20 pm.   Final Discharge Note (most recent)       Final Note - 05/29/23 1528          Final Note    Assessment Type Final Discharge Note     Anticipated Discharge Disposition Home or Self Care     What phone number can be called within the next 1-3 days to see how you are doing after discharge? [phone]        Post-Acute Status    Discharge Delays None known at this time                     Important Message from Medicare             Contact Info       Raymundo Stubbs MD   Specialty: Family Medicine   Relationship: PCP - General    [address] [place] [address] [place] [address]   Phone: [phone]       Next Steps: Go on 5/31/2023    Instructions: appointment Wednesday May 31st at 2:20pm for follow-up of hospitalization    [facility]        Next Steps: Go on 5/31/2023    Instructions: per your scheduled appointment

## 2023-05-30 ENCOUNTER — OFFICE VISIT (OUTPATIENT)
Dept: FAMILY MEDICINE | Facility: CLINIC | Age: 53
End: 2023-05-30
Payer: COMMERCIAL

## 2023-05-30 VITALS
HEIGHT: 76 IN | SYSTOLIC BLOOD PRESSURE: 138 MMHG | HEART RATE: 78 BPM | BODY MASS INDEX: 29.45 KG/M2 | OXYGEN SATURATION: 95 % | TEMPERATURE: 98 F | WEIGHT: 241.81 LBS | RESPIRATION RATE: 13 BRPM | DIASTOLIC BLOOD PRESSURE: 84 MMHG

## 2023-05-30 DIAGNOSIS — F31.31 BIPOLAR AFFECTIVE DISORDER, CURRENTLY DEPRESSED, MILD: ICD-10-CM

## 2023-05-30 DIAGNOSIS — I10 ESSENTIAL HYPERTENSION: ICD-10-CM

## 2023-05-30 DIAGNOSIS — Z09 HOSPITAL DISCHARGE FOLLOW-UP: Primary | ICD-10-CM

## 2023-05-30 PROCEDURE — 99999 PR PBB SHADOW E&M-EST. PATIENT-LVL III: CPT | Mod: PBBFAC,,, | Performed by: FAMILY MEDICINE

## 2023-05-30 PROCEDURE — 4010F ACE/ARB THERAPY RXD/TAKEN: CPT | Mod: S$GLB,,, | Performed by: FAMILY MEDICINE

## 2023-05-30 PROCEDURE — 1160F PR REVIEW ALL MEDS BY PRESCRIBER/CLIN PHARMACIST DOCUMENTED: ICD-10-PCS | Mod: S$GLB,,, | Performed by: FAMILY MEDICINE

## 2023-05-30 PROCEDURE — 3008F BODY MASS INDEX DOCD: CPT | Mod: S$GLB,,, | Performed by: FAMILY MEDICINE

## 2023-05-30 PROCEDURE — 99214 PR OFFICE/OUTPT VISIT, EST, LEVL IV, 30-39 MIN: ICD-10-PCS | Mod: S$GLB,,, | Performed by: FAMILY MEDICINE

## 2023-05-30 PROCEDURE — 3079F DIAST BP 80-89 MM HG: CPT | Mod: S$GLB,,, | Performed by: FAMILY MEDICINE

## 2023-05-30 PROCEDURE — 3075F SYST BP GE 130 - 139MM HG: CPT | Mod: S$GLB,,, | Performed by: FAMILY MEDICINE

## 2023-05-30 PROCEDURE — 1160F RVW MEDS BY RX/DR IN RCRD: CPT | Mod: S$GLB,,, | Performed by: FAMILY MEDICINE

## 2023-05-30 PROCEDURE — 1159F PR MEDICATION LIST DOCUMENTED IN MEDICAL RECORD: ICD-10-PCS | Mod: S$GLB,,, | Performed by: FAMILY MEDICINE

## 2023-05-30 PROCEDURE — 3079F PR MOST RECENT DIASTOLIC BLOOD PRESSURE 80-89 MM HG: ICD-10-PCS | Mod: S$GLB,,, | Performed by: FAMILY MEDICINE

## 2023-05-30 PROCEDURE — 4010F PR ACE/ARB THEARPY RXD/TAKEN: ICD-10-PCS | Mod: S$GLB,,, | Performed by: FAMILY MEDICINE

## 2023-05-30 PROCEDURE — 3008F PR BODY MASS INDEX (BMI) DOCUMENTED: ICD-10-PCS | Mod: S$GLB,,, | Performed by: FAMILY MEDICINE

## 2023-05-30 PROCEDURE — 99214 OFFICE O/P EST MOD 30 MIN: CPT | Mod: S$GLB,,, | Performed by: FAMILY MEDICINE

## 2023-05-30 PROCEDURE — 1159F MED LIST DOCD IN RCRD: CPT | Mod: S$GLB,,, | Performed by: FAMILY MEDICINE

## 2023-05-30 PROCEDURE — 3075F PR MOST RECENT SYSTOLIC BLOOD PRESS GE 130-139MM HG: ICD-10-PCS | Mod: S$GLB,,, | Performed by: FAMILY MEDICINE

## 2023-05-30 PROCEDURE — 99999 PR PBB SHADOW E&M-EST. PATIENT-LVL III: ICD-10-PCS | Mod: PBBFAC,,, | Performed by: FAMILY MEDICINE

## 2023-05-30 RX ORDER — NEBIVOLOL 10 MG/1
20 TABLET ORAL DAILY
Qty: 180 TABLET | Refills: 3 | Status: SHIPPED | OUTPATIENT
Start: 2023-05-30 | End: 2024-05-29

## 2023-05-30 RX ORDER — LURASIDONE HYDROCHLORIDE 40 MG/1
TABLET, FILM COATED ORAL
Qty: 45 TABLET | Refills: 3 | Status: SHIPPED | OUTPATIENT
Start: 2023-05-30

## 2023-05-30 RX ORDER — LOSARTAN POTASSIUM 50 MG/1
25 TABLET ORAL DAILY
Qty: 45 TABLET | Refills: 3 | Status: SHIPPED | OUTPATIENT
Start: 2023-05-30

## 2023-05-30 NOTE — PROGRESS NOTES
"[facility] - Clinic Note    Subjective      Mr. Reyes is a 52 y.o. male who presents to clinic for Hospital Follow Up (refills)    Patient was admitted to the hospital for alcohol withdrawal symptoms.  He was on a Librium taper but it was not effective.  Now on Valium taper.  Tomorrow he is going to inpatient rehab for alcoholism.    PMH Georgette has a past medical history of Bipolar disorder, History of alcohol abuse, and Hypertension.   PSXH Georgette has a past surgical history that includes Cholecystectomy; Back surgery; Anterior cruciate ligament repair; and gastroplasty, sleeve, endoscopic.    Georgette's family history includes Hypertension in his father and mother.   SH Georgette reports that he has been smoking cigarettes. He has been smoking an average of 1 pack per day. He has never used smokeless tobacco. He reports that he does not currently use alcohol. He reports that he does not use drugs.   ALG Georgette has No Known Allergies.   MED Georgette has a current medication list which includes the following prescription(s): diazepam, folic acid, multivitamin, ondansetron, sildenafil, thiamine, losartan, lurasidone, and nebivolol.     Review of Systems   Constitutional:  Negative for chills and fever.   Respiratory:  Negative for shortness of breath.    Cardiovascular:  Negative for chest pain.   Objective     /84 (BP Location: Right arm, Patient Position: Sitting, BP Method: Large (Manual))   Pulse 78   Temp 97.8 °F (36.6 °C) (Temporal)   Resp 13   Ht 6' 4" (1.93 m)   Wt 109.7 kg (241 lb 12.8 oz)   SpO2 95%   BMI 29.43 kg/m²     Physical Exam  Vitals and nursing note reviewed.   Constitutional:       General: He is not in acute distress.     Appearance: Normal appearance. He is well-developed. He is not diaphoretic.   HENT:      Head: Normocephalic and atraumatic.      Right Ear: External ear normal.      Left Ear: External ear normal.   Eyes:      General:         Right eye: No discharge.         Left eye: " No discharge.   Cardiovascular:      Rate and Rhythm: Normal rate and regular rhythm.      Heart sounds: Normal heart sounds.   Pulmonary:      Effort: Pulmonary effort is normal.      Breath sounds: Normal breath sounds. No wheezing or rales.   Skin:     General: Skin is warm and dry.   Neurological:      Mental Status: He is alert and oriented to person, place, and time. Mental status is at baseline.   Psychiatric:         Mood and Affect: Mood normal.         Behavior: Behavior normal.         Thought Content: Thought content normal.         Judgment: Judgment normal.      Assessment/Plan     1. Hospital discharge follow-up        2. Essential hypertension  losartan (COZAAR) 50 MG tablet    nebivoloL (BYSTOLIC) 10 MG Tab      3. Bipolar affective disorder, currently depressed, mild  lurasidone (LATUDA) 40 mg Tab tablet        Improved post hospitalization.  Finish Valium taper.  Refilled medications as above.  Will follow-up after discharge from rehab.    Future Appointments   Date Time Provider Department Center   10/17/2023  3:40 PM Raymundo Stubbs MD [facility]         Raymundo Stubbs MD  Family Medicine  [facility]

## 2023-05-31 ENCOUNTER — PATIENT MESSAGE (OUTPATIENT)
Dept: PSYCHIATRY | Facility: CLINIC | Age: 53
End: 2023-05-31

## 2023-09-07 ENCOUNTER — HOSPITAL ENCOUNTER (EMERGENCY)
Facility: HOSPITAL | Age: 53
Discharge: HOME | End: 2023-09-08
Attending: EMERGENCY MEDICINE

## 2023-09-07 DIAGNOSIS — Z00.8 MEDICAL CLEARANCE FOR PSYCHIATRIC ADMISSION: ICD-10-CM

## 2023-09-07 DIAGNOSIS — F10.10 ETOH ABUSE: Primary | ICD-10-CM

## 2023-09-07 LAB
ALBUMIN SERPL BCP-MCNC: 3.7 G/DL (ref 3.5–5.2)
ALP SERPL-CCNC: 99 U/L (ref 55–135)
ALT SERPL W/O P-5'-P-CCNC: 19 U/L (ref 10–44)
AMPHET+METHAMPHET UR QL: NEGATIVE
ANION GAP SERPL CALC-SCNC: 18 MMOL/L (ref 8–16)
APAP SERPL-MCNC: <3 UG/ML (ref 10–20)
AST SERPL-CCNC: 23 U/L (ref 10–40)
BARBITURATES UR QL SCN>200 NG/ML: NEGATIVE
BASOPHILS # BLD AUTO: 0.03 K/UL (ref 0–0.2)
BASOPHILS NFR BLD: 0.4 % (ref 0–1.9)
BENZODIAZ UR QL SCN>200 NG/ML: NEGATIVE
BILIRUB SERPL-MCNC: 1.5 MG/DL (ref 0.1–1)
BILIRUB UR QL STRIP: NEGATIVE
BUN SERPL-MCNC: 11 MG/DL (ref 6–20)
BZE UR QL SCN: NEGATIVE
CALCIUM SERPL-MCNC: 8.8 MG/DL (ref 8.7–10.5)
CANNABINOIDS UR QL SCN: NEGATIVE
CHLORIDE SERPL-SCNC: 107 MMOL/L (ref 95–110)
CLARITY UR: CLEAR
CO2 SERPL-SCNC: 21 MMOL/L (ref 23–29)
COLOR UR: YELLOW
CREAT SERPL-MCNC: 0.9 MG/DL (ref 0.5–1.4)
CREAT UR-MCNC: 58.2 MG/DL (ref 23–375)
DIFFERENTIAL METHOD: ABNORMAL
EOSINOPHIL # BLD AUTO: 0 K/UL (ref 0–0.5)
EOSINOPHIL NFR BLD: 0.6 % (ref 0–8)
ERYTHROCYTE [DISTWIDTH] IN BLOOD BY AUTOMATED COUNT: 12 % (ref 11.5–14.5)
EST. GFR  (NO RACE VARIABLE): >60 ML/MIN/1.73 M^2
ETHANOL SERPL-MCNC: 348 MG/DL (ref 0–10)
GLUCOSE SERPL-MCNC: 96 MG/DL (ref 70–110)
GLUCOSE UR QL STRIP: NEGATIVE
HCT VFR BLD AUTO: 45.9 % (ref 40–54)
HGB BLD-MCNC: 16.5 G/DL (ref 14–18)
HGB UR QL STRIP: NEGATIVE
IMM GRANULOCYTES # BLD AUTO: 0.01 K/UL (ref 0–0.04)
IMM GRANULOCYTES NFR BLD AUTO: 0.1 % (ref 0–0.5)
KETONES UR QL STRIP: NEGATIVE
LEUKOCYTE ESTERASE UR QL STRIP: NEGATIVE
LYMPHOCYTES # BLD AUTO: 3.3 K/UL (ref 1–4.8)
LYMPHOCYTES NFR BLD: 46.8 % (ref 18–48)
MCH RBC QN AUTO: 32.7 PG (ref 27–31)
MCHC RBC AUTO-ENTMCNC: 35.9 G/DL (ref 32–36)
MCV RBC AUTO: 91 FL (ref 82–98)
METHADONE UR QL SCN>300 NG/ML: NEGATIVE
MONOCYTES # BLD AUTO: 0.5 K/UL (ref 0.3–1)
MONOCYTES NFR BLD: 6.3 % (ref 4–15)
NEUTROPHILS # BLD AUTO: 3.3 K/UL (ref 1.8–7.7)
NEUTROPHILS NFR BLD: 45.8 % (ref 38–73)
NITRITE UR QL STRIP: NEGATIVE
NRBC BLD-RTO: 0 /100 WBC
OPIATES UR QL SCN: NEGATIVE
PCP UR QL SCN>25 NG/ML: NEGATIVE
PH UR STRIP: 7 [PH] (ref 5–8)
PLATELET # BLD AUTO: 182 K/UL (ref 150–450)
PMV BLD AUTO: 8.6 FL (ref 9.2–12.9)
POTASSIUM SERPL-SCNC: 3.7 MMOL/L (ref 3.5–5.1)
PROT SERPL-MCNC: 6.3 G/DL (ref 6–8.4)
PROT UR QL STRIP: NEGATIVE
RBC # BLD AUTO: 5.05 M/UL (ref 4.6–6.2)
SALICYLATES SERPL-MCNC: <5 MG/DL (ref 15–30)
SARS-COV-2 RDRP RESP QL NAA+PROBE: NEGATIVE
SODIUM SERPL-SCNC: 146 MMOL/L (ref 136–145)
SP GR UR STRIP: 1.01 (ref 1–1.03)
TOXICOLOGY INFORMATION: NORMAL
TSH SERPL DL<=0.005 MIU/L-ACNC: 1.57 UIU/ML (ref 0.4–4)
URN SPEC COLLECT METH UR: NORMAL
UROBILINOGEN UR STRIP-ACNC: 1 EU/DL
WBC # BLD AUTO: 7.13 K/UL (ref 3.9–12.7)

## 2023-09-07 PROCEDURE — 82077 ASSAY SPEC XCP UR&BREATH IA: CPT | Performed by: EMERGENCY MEDICINE

## 2023-09-07 PROCEDURE — 85025 COMPLETE CBC W/AUTO DIFF WBC: CPT | Performed by: EMERGENCY MEDICINE

## 2023-09-07 PROCEDURE — 81003 URINALYSIS AUTO W/O SCOPE: CPT | Mod: 59 | Performed by: EMERGENCY MEDICINE

## 2023-09-07 PROCEDURE — 93010 EKG 12-LEAD: ICD-10-PCS | Mod: ,,, | Performed by: INTERNAL MEDICINE

## 2023-09-07 PROCEDURE — 25000003 PHARM REV CODE 250: Performed by: EMERGENCY MEDICINE

## 2023-09-07 PROCEDURE — U0002 COVID-19 LAB TEST NON-CDC: HCPCS | Performed by: EMERGENCY MEDICINE

## 2023-09-07 PROCEDURE — 80179 DRUG ASSAY SALICYLATE: CPT | Performed by: EMERGENCY MEDICINE

## 2023-09-07 PROCEDURE — 93010 ELECTROCARDIOGRAM REPORT: CPT | Mod: ,,, | Performed by: INTERNAL MEDICINE

## 2023-09-07 PROCEDURE — 84443 ASSAY THYROID STIM HORMONE: CPT | Performed by: EMERGENCY MEDICINE

## 2023-09-07 PROCEDURE — 80143 DRUG ASSAY ACETAMINOPHEN: CPT | Performed by: EMERGENCY MEDICINE

## 2023-09-07 PROCEDURE — 80053 COMPREHEN METABOLIC PANEL: CPT | Performed by: EMERGENCY MEDICINE

## 2023-09-07 PROCEDURE — 80307 DRUG TEST PRSMV CHEM ANLYZR: CPT | Performed by: EMERGENCY MEDICINE

## 2023-09-07 PROCEDURE — 99284 EMERGENCY DEPT VISIT MOD MDM: CPT

## 2023-09-07 PROCEDURE — 93005 ELECTROCARDIOGRAM TRACING: CPT

## 2023-09-07 RX ORDER — OLANZAPINE 5 MG/1
10 TABLET, ORALLY DISINTEGRATING ORAL ONCE
Status: COMPLETED | OUTPATIENT
Start: 2023-09-07 | End: 2023-09-07

## 2023-09-07 RX ADMIN — OLANZAPINE 10 MG: 5 TABLET, ORALLY DISINTEGRATING ORAL at 11:09

## 2023-09-08 VITALS
DIASTOLIC BLOOD PRESSURE: 74 MMHG | TEMPERATURE: 98 F | RESPIRATION RATE: 19 BRPM | HEIGHT: 75 IN | WEIGHT: 220 LBS | SYSTOLIC BLOOD PRESSURE: 131 MMHG | HEART RATE: 90 BPM | OXYGEN SATURATION: 97 % | BODY MASS INDEX: 27.35 KG/M2

## 2023-09-08 LAB — ETHANOL SERPL-MCNC: 47 MG/DL (ref 0–10)

## 2023-09-08 PROCEDURE — 36415 COLL VENOUS BLD VENIPUNCTURE: CPT | Performed by: EMERGENCY MEDICINE

## 2023-09-08 PROCEDURE — 82077 ASSAY SPEC XCP UR&BREATH IA: CPT | Performed by: EMERGENCY MEDICINE

## 2023-09-08 PROCEDURE — 25000003 PHARM REV CODE 250: Performed by: FAMILY MEDICINE

## 2023-09-08 RX ORDER — CHLORDIAZEPOXIDE HYDROCHLORIDE 25 MG/1
CAPSULE, GELATIN COATED ORAL
Qty: 60 CAPSULE | Refills: 0 | Status: SHIPPED | OUTPATIENT
Start: 2023-09-08 | End: 2023-09-20

## 2023-09-08 RX ORDER — DIAZEPAM 5 MG/1
10 TABLET ORAL
Status: COMPLETED | OUTPATIENT
Start: 2023-09-08 | End: 2023-09-08

## 2023-09-08 RX ADMIN — DIAZEPAM 10 MG: 5 TABLET ORAL at 06:09

## 2023-09-08 NOTE — ED NOTES
See triage note. Pt reports recently receiving treatment in [place] for rehab. Pt c/o stressful triggers of not having a job; he expresses his wishes to cease from drinking and became tearful. Pt reports his neighbor checked on him today and found him in his drunken state. No acute physical distress noted. Pt reports that he sometimes gets frequent PVCs when he drinks too much. Food at pt bedside. Will continue to monitor.

## 2023-09-08 NOTE — ED NOTES
Pt requesting BP be checked.  Bp 131 76 at this time.  No other concerns voiced at this time.  NAD noted.  Medicated per MAR.

## 2023-09-08 NOTE — ED NOTES
"Pt here for mental health problem.  Pt states he is here seeking help with alcohol abuse.  Pt reports recent treatment at the [facility] in [place], [place] and was released approximately 2 weeks ago.  Pt states he was doing well but has relapsed.  Pt states he recently lost his job for drinking on the job and is actively attempting to get a new one but states " I know I can't get one in this condition."   Pt denies SI/HI at this time.  Has no thoughts or intentions of harming self.  Pt voices no other needs at this time.  All orders carried out at this time.  Pt tolerated well.  NAD noted.  Pt provided sandwhich, chips, and beverage as well as blanket.  Will continue to monitor.   "

## 2023-09-08 NOTE — ED PROVIDER NOTES
"Encounter Date: 9/7/2023       History     Chief Complaint   Patient presents with    Mental Health Problem     Pt states he is mentally and physically exhausted.  States "I amm just a drunk".  Pt reports drinking 1 pint of vodka a day.  Released from rehab two weeks ago.      Pt with hx of ETOHism here with c/o being exhausted mentally and physically from not being able to quit drinking. He is not suicidal but says he needs help bc he cannot stop drinking and this is making him depressed. He says he drink 1-2 pints vodka daily. He was released from rehab 2wks ago but says he went right back to drinking. He has no organic complaints. He denies drug abuse. Last drink was today.     The history is provided by the patient.   Mental Health Problem  The primary symptoms include depressed mood and dysphoric mood. The primary symptoms do not include aggression, agitation, bizarre behavior, delusions, disorganized speech, disorganized thinking, hallucinations, homicidal ideas, negative symptoms, paranoia, self-injury, somatic symptoms, suicidal ideas, suicidal threats or suicide attempt. This is a recurrent problem.   The onset of the illness is precipitated by alcohol abuse. The degree of incapacity that he is experiencing as a consequence of his illness is moderate. Sequelae of the illness include an inability to work, an inability to care for self and harmed interpersonal relations. Additional symptoms of the illness include anhedonia, insomnia, appetite change, unexpected weight change, fatigue and feelings of worthlessness. Additional symptoms of the illness do not include hypersomnia, agitation, psychomotor retardation, attention impairment, euphoric mood, increased goal-directed activity, flight of ideas, inflated self-esteem, decreased need for sleep, distractible, poor judgment, visual change, headaches, abdominal pain or seizures. He does not admit to suicidal ideas. He does not have a plan to attempt suicide. He " does not contemplate harming himself. He has not already injured self. He does not contemplate injuring another person. He has not already  injured another person. Risk factors that are present for mental illness include substance abuse.     Review of patient's allergies indicates:  No Known Allergies  Past Medical History:   Diagnosis Date    Bipolar disorder     History of alcohol abuse     Hypertension      Past Surgical History:   Procedure Laterality Date    ANTERIOR CRUCIATE LIGAMENT REPAIR      BACK SURGERY      CHOLECYSTECTOMY      GASTROPLASTY, SLEEVE, ENDOSCOPIC       Family History   Problem Relation Age of Onset    Hypertension Mother     Hypertension Father      Social History     Tobacco Use    Smoking status: Every Day     Current packs/day: 1.00     Types: Cigarettes    Smokeless tobacco: Never   Substance Use Topics    Alcohol use: Not Currently     Comment: 1 pint of vodka    Drug use: Never     Review of Systems   Constitutional:  Positive for appetite change, fatigue and unexpected weight change.   Gastrointestinal:  Negative for abdominal pain.   Neurological:  Negative for seizures and headaches.   Psychiatric/Behavioral:  Positive for dysphoric mood. Negative for agitation, hallucinations, homicidal ideas, paranoia, self-injury and suicidal ideas. The patient has insomnia.    All other systems reviewed and are negative.      Physical Exam     Initial Vitals [09/07/23 1834]   BP Pulse Resp Temp SpO2   123/73 75 18 97.8 °F (36.6 °C) 97 %      MAP       --         Physical Exam    Nursing note and vitals reviewed.  Constitutional: He appears well-developed and well-nourished. He is not diaphoretic. No distress.   Pleasant and cooperative. Mildly intoxicated appearing   HENT:   Head: Normocephalic and atraumatic.   Mouth/Throat: Oropharynx is clear and moist.   Eyes: EOM are normal. Pupils are equal, round, and reactive to light. No scleral icterus.   Mild injection OU   Neck: Neck supple.    Normal range of motion.  Cardiovascular:  Normal rate, regular rhythm, normal heart sounds and intact distal pulses.           Pulmonary/Chest: Breath sounds normal.   Abdominal: Abdomen is soft. There is no abdominal tenderness.   Musculoskeletal:         General: No tenderness or edema. Normal range of motion.      Cervical back: Normal range of motion and neck supple.     Neurological: He is alert and oriented to person, place, and time. He has normal strength. No cranial nerve deficit or sensory deficit. GCS score is 15. GCS eye subscore is 4. GCS verbal subscore is 5. GCS motor subscore is 6.   Skin: Skin is warm and dry. Capillary refill takes less than 2 seconds. No rash noted. No erythema. No pallor.   Psychiatric: He has a normal mood and affect. His speech is normal and behavior is normal. Thought content normal. He is not actively hallucinating. Cognition and memory are normal. He is attentive.         ED Course   Procedures  Labs Reviewed   CBC W/ AUTO DIFFERENTIAL - Abnormal; Notable for the following components:       Result Value    MCH 32.7 (*)     MPV 8.6 (*)     All other components within normal limits   COMPREHENSIVE METABOLIC PANEL - Abnormal; Notable for the following components:    Sodium 146 (*)     CO2 21 (*)     Total Bilirubin 1.5 (*)     Anion Gap 18 (*)     All other components within normal limits   ALCOHOL,MEDICAL (ETHANOL) - Abnormal; Notable for the following components:    Alcohol, Serum 348 (*)     All other components within normal limits    Narrative:       result(s) called and verbal readback obtained from     gene wise @ 1955 [place] [facility] 09/07/2023 19:53   ACETAMINOPHEN LEVEL - Abnormal; Notable for the following components:    Acetaminophen (Tylenol), Serum <3.0 (*)     All other components within normal limits   SALICYLATE LEVEL - Abnormal; Notable for the following components:    Salicylate Lvl <5.0 (*)     All other components within normal limits   ALCOHOL,MEDICAL  (ETHANOL) - Abnormal; Notable for the following components:    Alcohol, Serum 47 (*)     All other components within normal limits   TSH   URINALYSIS, REFLEX TO URINE CULTURE    Narrative:     Preferred Collection Type->Urine, Clean Catch  Specimen Source->Urine   DRUG SCREEN PANEL, URINE EMERGENCY    Narrative:     Preferred Collection Type->Urine, Clean Catch  Specimen Source->Urine   SARS-COV-2 RNA AMPLIFICATION, QUAL    Narrative:     Is the patient symptomatic?->No     EKG Readings: (Independently Interpreted)   Rhythm: Normal Sinus Rhythm. Heart Rate: 67. Ectopy: No Ectopy. Conduction: Normal. ST Segments: Normal ST Segments. T Waves: Normal. Axis: Normal. Clinical Impression: Normal Sinus Rhythm       Imaging Results    None          Medications   OLANZapine zydis disintegrating tablet 10 mg (10 mg Oral Given 9/7/23 6166)   diazePAM tablet 10 mg (10 mg Oral Given 9/8/23 6851)     Medical Decision Making  Pt with ETOHism and depression presented with intoxication and depressed mood. He denies SI and was pleasant and cooperative. He requested inpatient rehab placement. His exam was unremarkable other than intoxication. Psych medical clearance labs essentially normal. Unable to medically clear due this his ETOH level. EKG normal. Plan was to recheck ETOH level this am and reassess for placement and get tele psych consult, but will leave dispo up to oncoming MD at shift change. He will be medically cleared for inpatient admission vs discharge home when ETOH level less than 100 and pt clinically sober.       During the patient's ER visit, he became sober and had mild tremors.  The patient claimed he take care of some business at home.  The patient still is determined to obtain help to become sober.  The patient has not had any suicidal ideations or homicidal ideations.  The patient has no hallucinations.  Repeat alcohol level was 47.  I discussed and counseled as to strategy to obtain help such as inpatient versus  outpatient treatments.  I discussed AA.  I discussed the need to tell his family that he is trying to quit drinking alcohol as well.  Patient was also told to discuss this with his primary care physician so that they can help arrange a plan as well.  I have prescribed the patient some Librium for prophylaxis.  I informed him he should return to the ER at any time if he feels he is starting to have withdrawals.  Also informed him he should let his family know that he may withdrawal symptoms and they should be made aware that he is trying to abstain from alcohol.  Patient voiced understanding.  Patient called for a ride home.    Amount and/or Complexity of Data Reviewed  Labs: ordered.    Risk  Prescription drug management.               ED Course as of 09/08/23 0653   Thu Sep 07, 2023   2055 ETOH metabolism with be 10-12 hours before he is medically cleared.  [DC]   Fri Sep 08, 2023   0156 No change. Sleeping. VSS. [DC]      ED Course User Index  [DC] Cameron Delacruz Jr., MD                    Clinical Impression:   Final diagnoses:  [Z00.8] Medical clearance for psychiatric admission  [F10.10] ETOH abuse (Primary)        ED Disposition Condition    Discharge Stable          ED Prescriptions       Medication Sig Dispense Start Date End Date Auth. Provider    chlordiazepoxide (LIBRIUM) 25 MG Cap Take 3 capsules (75 mg total) by mouth every 6 (six) hours for 2 days, THEN 2 capsules (50 mg total) every 6 (six) hours for 2 days, THEN 1 capsule (25 mg total) every 6 (six) hours for 2 days, THEN 1 capsule (25 mg total) every 8 (eight) hours for 2 days, THEN 1 capsule (25 mg total) every 12 (twelve) hours for 2 days, THEN 1 capsule (25 mg total) every evening for 2 days. 60 capsule 9/8/2023 9/20/2023 Dalton Shaffer MD          Follow-up Information    None     Follow-up with primary care physician.  Return to ER if withdrawal symptoms occur or if depressive symptoms worsen.  Please discuss inpatient versus outpatient  therapy with PCP.     Dalton Shaffer MD  09/08/23 0686

## 2023-11-01 ENCOUNTER — DOCUMENTATION ONLY (OUTPATIENT)
Dept: PSYCHIATRY | Facility: CLINIC | Age: 53
End: 2023-11-01

## 2023-11-01 NOTE — PROGRESS NOTES
Last seen 09FEB2023 for followup. Pt did NOT respond to outreach attempts by staff for scheduling 1 month followup. Assume self discharge. Case closed [id number].

## 2024-12-19 ENCOUNTER — DOCUMENTATION ONLY (OUTPATIENT)
Dept: PSYCHIATRY | Facility: CLINIC | Age: 54
End: 2024-12-19

## 2024-12-19 NOTE — PROGRESS NOTES
Outreach Note    12/19/2024    Chart Review:     LAST VISIT 2/9/2023   Cancellations: 3/9/2023   NO-SHOWS: N/A   Outreach Attempts: N/A     Future Scheduling:       If pt would like to re-engage in treatment within 3 years from the LAST VISIT (see above), then the next appointment should be in person at the clinic and should also be an extended visit (an hour).            Ciaran Prather DO  Department of Psychiatry, [facility]